# Patient Record
Sex: FEMALE | Race: WHITE | NOT HISPANIC OR LATINO | Employment: UNEMPLOYED | URBAN - NONMETROPOLITAN AREA
[De-identification: names, ages, dates, MRNs, and addresses within clinical notes are randomized per-mention and may not be internally consistent; named-entity substitution may affect disease eponyms.]

---

## 2018-09-25 ENCOUNTER — OFFICE VISIT (OUTPATIENT)
Dept: INTERNAL MEDICINE | Facility: CLINIC | Age: 28
End: 2018-09-25

## 2018-09-25 VITALS
WEIGHT: 288 LBS | SYSTOLIC BLOOD PRESSURE: 124 MMHG | HEART RATE: 85 BPM | TEMPERATURE: 98.4 F | OXYGEN SATURATION: 98 % | HEIGHT: 68 IN | DIASTOLIC BLOOD PRESSURE: 78 MMHG | BODY MASS INDEX: 43.65 KG/M2

## 2018-09-25 DIAGNOSIS — F41.1 GENERALIZED ANXIETY DISORDER: Primary | ICD-10-CM

## 2018-09-25 DIAGNOSIS — Z76.89 ENCOUNTER TO ESTABLISH CARE: ICD-10-CM

## 2018-09-25 PROBLEM — M32.9 HISTORY OF SYSTEMIC LUPUS ERYTHEMATOSUS (SLE) (HCC): Status: ACTIVE | Noted: 2018-09-25

## 2018-09-25 PROCEDURE — 99203 OFFICE O/P NEW LOW 30 MIN: CPT | Performed by: PHYSICIAN ASSISTANT

## 2018-09-25 NOTE — PROGRESS NOTES
Subjective   Delores Sprague is a 27 y.o. female who presents to [facility].  She recently moved here from [facility]. Her  is stationed here as an [employer] .     Chief Complaint:  She has 2 year old and 1.5 year old boys.   Has been on Zoloft or the last 10 months.  She struggled with postpartum depression after her 1st pregnancy and then after her 2nd pregnancy it worsened and she started Zoloft.  Zoloft has been helpful but she still has some anxiety at times and could use a sight dose increase. She denies SI, HI or sleep disturbances.       Lupus- diagnosed around age 12.  Suddenly stopped functioning, lost 80 pounds. She has gotten flare ups with malar rash, lung issues and kidney troubles periodically throughout adulthood with the last being right after delivering her 1st child in 2016. Last labs were done around March 2018 and she was old she is in remission.             The following portions of the patient's history were reviewed and updated as appropriate: She  has no past medical history on file.  She  does not have any pertinent problems on file.  She  has no past surgical history on file.  Her family history is not on file.  She  has no tobacco, alcohol, and drug history on file.  Current Outpatient Prescriptions   Medication Sig Dispense Refill   • sertraline (ZOLOFT) 50 MG tablet Take 1.5 tablets by mouth Daily for 90 days. 135 tablet 1     No current facility-administered medications for this visit.        Review of Systems  Review of Systems   Constitutional: Negative for appetite change, chills, fatigue, fever and unexpected weight change.        No malaise   HENT: Negative for ear pain, hearing loss, nosebleeds, rhinorrhea, sore throat, trouble swallowing and voice change.    Eyes: Negative for pain, discharge, redness, itching and visual disturbance.        No dry eyes   Respiratory: Negative for cough, shortness of breath and wheezing.         No SOB with exertion  No SOB  "lying down   Cardiovascular: Negative for chest pain, palpitations and leg swelling.        No leg cramps     Gastrointestinal: Negative for abdominal pain, blood in stool, constipation, diarrhea, nausea and vomiting.        No change in bowel habits  No heartburn   Endocrine: Negative for cold intolerance, heat intolerance, polydipsia, polyphagia and polyuria.        Hot flashes- with anxiety  No muscle weakness  No feeling of weakness   Genitourinary: Negative for difficulty urinating, dyspareunia, dysuria, frequency, hematuria, menstrual problem, pelvic pain, urgency, vaginal discharge and vaginal pain.        No urinary incontinence  No change in periods   Musculoskeletal: Negative for arthralgias, joint swelling and myalgias.        No limb pain  No limb swelling   Skin: Negative for rash and wound.        No rash  No lesions  No change in mole  No itching   Neurological: Negative for dizziness, seizures, syncope, weakness, numbness and headaches.        No confusion  No tingling  No trouble walking   Hematological: Negative for adenopathy. Does not bruise/bleed easily.   Psychiatric/Behavioral: Negative for confusion, dysphoric mood, sleep disturbance and suicidal ideas. The patient is nervous/anxious.         No change in personality         Objective    /78   Pulse 85   Temp 98.4 °F (36.9 °C)   Ht 172.7 cm (68\")   Wt 131 kg (288 lb)   SpO2 98%   BMI 43.79 kg/m²   Physical Exam   Constitutional: She is oriented to person, place, and time. She appears well-developed and well-nourished. No distress.   HENT:   Head: Normocephalic and atraumatic.   Right Ear: External ear normal.   Left Ear: External ear normal.   Nose: Nose normal.   Mouth/Throat: Oropharynx is clear and moist.   Eyes: Pupils are equal, round, and reactive to light. EOM are normal.   Neck: Normal range of motion. Neck supple. No thyromegaly present.   Cardiovascular: Normal rate, regular rhythm and normal heart sounds.  Exam reveals " no gallop and no friction rub.    No murmur heard.  Pulmonary/Chest: Effort normal and breath sounds normal. No respiratory distress. She has no wheezes. She has no rales. She exhibits no tenderness.   Abdominal: Soft. Bowel sounds are normal. She exhibits no distension and no mass. There is no tenderness. No hernia.   Musculoskeletal: Normal range of motion. She exhibits no edema, tenderness or deformity.   Lymphadenopathy:     She has no cervical adenopathy.   Neurological: She is alert and oriented to person, place, and time. She displays normal reflexes. No cranial nerve deficit or sensory deficit. Coordination normal.   Skin: Skin is warm and dry. Capillary refill takes less than 2 seconds. No rash noted. She is not diaphoretic. No erythema. No pallor.   Psychiatric: She has a normal mood and affect. Her behavior is normal. Judgment and thought content normal.   Nursing note and vitals reviewed.        Assessment/Plan      Diagnosis Plan   1. Generalized anxiety disorder  Dose increase to 75 mg via 1.5 tablets daily. sertraline (ZOLOFT) 50 MG tablet     2. Encounter to establish care             Return in about 6 months (around 3/25/2019) for Next scheduled follow up.

## 2018-11-16 ENCOUNTER — OFFICE VISIT (OUTPATIENT)
Dept: INTERNAL MEDICINE | Facility: CLINIC | Age: 28
End: 2018-11-16

## 2018-11-16 VITALS
SYSTOLIC BLOOD PRESSURE: 120 MMHG | TEMPERATURE: 98.4 F | WEIGHT: 268 LBS | DIASTOLIC BLOOD PRESSURE: 82 MMHG | BODY MASS INDEX: 40.62 KG/M2 | HEART RATE: 66 BPM | HEIGHT: 68 IN

## 2018-11-16 DIAGNOSIS — R10.821 RIGHT UPPER QUADRANT ABDOMINAL TENDERNESS WITH REBOUND TENDERNESS: ICD-10-CM

## 2018-11-16 DIAGNOSIS — R11.0 NAUSEA: ICD-10-CM

## 2018-11-16 DIAGNOSIS — Z00.00 PREVENTATIVE HEALTH CARE: ICD-10-CM

## 2018-11-16 DIAGNOSIS — R10.822 LEFT UPPER QUADRANT ABDOMINAL TENDERNESS WITH REBOUND TENDERNESS: ICD-10-CM

## 2018-11-16 DIAGNOSIS — H53.8 BLURRED VISION, BILATERAL: ICD-10-CM

## 2018-11-16 DIAGNOSIS — G44.031 INTRACTABLE EPISODIC PAROXYSMAL HEMICRANIA: ICD-10-CM

## 2018-11-16 DIAGNOSIS — R53.83 FATIGUE, UNSPECIFIED TYPE: Primary | ICD-10-CM

## 2018-11-16 DIAGNOSIS — G43.119 INTRACTABLE MIGRAINE WITH AURA WITHOUT STATUS MIGRAINOSUS: ICD-10-CM

## 2018-11-16 LAB
EXPIRATION DATE: NORMAL
HETEROPH AB SER QL LA: NEGATIVE
INTERNAL CONTROL: NORMAL
Lab: NORMAL

## 2018-11-16 PROCEDURE — 86308 HETEROPHILE ANTIBODY SCREEN: CPT | Performed by: PHYSICIAN ASSISTANT

## 2018-11-16 PROCEDURE — 99214 OFFICE O/P EST MOD 30 MIN: CPT | Performed by: PHYSICIAN ASSISTANT

## 2018-11-16 RX ORDER — SUMATRIPTAN 50 MG/1
TABLET, FILM COATED ORAL
Qty: 9 TABLET | Refills: 2 | Status: SHIPPED | OUTPATIENT
Start: 2018-11-16 | End: 2019-06-24 | Stop reason: ALTCHOICE

## 2018-11-16 RX ORDER — PROMETHAZINE HYDROCHLORIDE 25 MG/1
25 TABLET ORAL EVERY 6 HOURS PRN
Qty: 20 TABLET | Refills: 0 | Status: SHIPPED | OUTPATIENT
Start: 2018-11-16 | End: 2019-10-09

## 2018-11-16 NOTE — PROGRESS NOTES
Delores Sprague is a 27 y.o. female.     Subjective   History of Present Illness   Here today with concern of having intermittently blurry vision for the last 6 months or so and worsening.  Visual disturbance began by occurring once monthly or so and now is occurring every couple of days.  Right eye vision is worse than left.  She has developed headaches within the last 2-3 weeks which occur mostly on the right side.  Visual disturbances either start before the headache or occurs during the headache.  No history of migraines. She has tried ibuprofen which does not help at all. She described the visual disturbance as wavy with black in the periphery and some black spotting. She does have nausea with headaches. She has also had some intermittent aches in RUQ and LUQ. No fever, chills or sore throat. Has been fatigued.           The following portions of the patient's history were reviewed and updated as appropriate: allergies, current medications, past family history, past medical history, past social history, past surgical history and problem list.    Review of Systems    Constitutional: fatigue. Negative for appetite change, chills, fever and unexpected weight change.   HENT: Negative for congestion, ear pain, hearing loss, nosebleeds, postnasal drip, rhinorrhea, sore throat, tinnitus and trouble swallowing.    Eyes: visual disturbance. Negative for photophobia, discharge.   Respiratory: Negative for cough, chest tightness, shortness of breath and wheezing.    Cardiovascular: Negative for chest pain, palpitations and leg swelling.   Gastrointestinal: abdominal discomfort, nausea. Negative for abdominal distention, blood in stool, constipation, diarrhea and vomiting.   Endocrine: Negative for cold intolerance, heat intolerance, polydipsia, polyphagia and polyuria.   Musculoskeletal: Negative for arthralgias, back pain, joint swelling, myalgias, neck pain and neck stiffness.   Skin: Negative for color change, pallor,  "rash and wound.   Allergic/Immunologic: Negative for environmental allergies, food allergies and immunocompromised state.   Neurological: headaches. Negative for dizziness, tremors, seizures, weakness, numbness.  Hematological: Negative for adenopathy. Does not bruise/bleed easily.   Psychiatric/Behavioral: Negative for sleep disturbances, agitation, behavioral problems, confusion, hallucinations, self-injury and suicidal ideas. The patient is not nervous/anxious.      Objective    Physical Exam  Constitutional: Appears well-developed and well-nourished.   HENT: OP normal. TMs normal.   Head: no sinus tenderness. Normocephalic and atraumatic.   Eyes: EOM are normal. Pupils are equal, round, and reactive to light.   Neck: mild bilateral posterior cervical lymphadenopathy.  Normal range of motion. Neck supple.   Cardiovascular: Normal rate, regular rhythm and normal heart sounds.    Pulmonary/Chest: Effort normal and breath sounds normal. No respiratory distress.  Has no wheezes or rales. Exhibits no chest wall tenderness.   Abdominal: mild LUQ and RUQ tenderness. Soft. Bowel sounds are normal. Exhibits no distension and no mass.   Musculoskeletal: Normal range of motion. Exhibits no tenderness.   Neurological: CN II-XII intact.   Skin: Skin is warm and dry.   Psychiatric: Has a normal mood and affect. Behavior is normal. Judgment and thought content normal.       /82   Pulse 66   Temp 98.4 °F (36.9 °C)   Ht 172.7 cm (67.99\")   Wt 122 kg (268 lb)   BMI 40.76 kg/m²     Nursing note and vitals reviewed.          Assessment/Plan   Delores was seen today for headache.    Diagnoses and all orders for this visit:    Fatigue, unspecified type  -     Hemoglobin A1c  -     Comprehensive Metabolic Panel  -     CBC & Differential  -     Prolactin  -     Jc-Barr Virus VCA Antibody Panel  -     POC Infectious Mononucleosis Antibody- negative    Intractable episodic paroxysmal hemicrania  -     promethazine " (PHENERGAN) 25 MG tablet; Take 1 tablet by mouth Every 6 (Six) Hours As Needed for Nausea or Vomiting (migraine onset).  -     SUMAtriptan (IMITREX) 50 MG tablet; Take one tablet at onset of headache. May repeat dose one time in 2 hours if headache not relieved.  -     Comprehensive Metabolic Panel  -     CBC & Differential    Blurred vision, bilateral  -     Hemoglobin A1c  -     Comprehensive Metabolic Panel  -     CBC & Differential  -     Prolactin    Intractable migraine with aura without status migrainosus  -     Comprehensive Metabolic Panel  -     CBC & Differential  -     Prolactin  -     POC Infectious Mononucleosis Antibody- negative    Right upper quadrant abdominal tenderness with rebound tenderness  -     Comprehensive Metabolic Panel  -     CBC & Differential  -     POC Infectious Mononucleosis Antibody- negative    Left upper quadrant abdominal tenderness with rebound tenderness  -     Comprehensive Metabolic Panel  -     CBC & Differential  -     POC Infectious Mononucleosis Antibody- negative    [facility] care  -     Lipid Panel    Nausea  -     promethazine (PHENERGAN) 25 MG tablet; Take 1 tablet by mouth Every 6 (Six) Hours As Needed for Nausea or Vomiting (migraine onset).  -     Vitamin B12  -     Vitamin D 25 Hydroxy  -     Comprehensive Metabolic Panel  -     CBC & Differential  -     Prolactin

## 2018-11-17 LAB
25(OH)D3+25(OH)D2 SERPL-MCNC: 31.3 NG/ML
ALBUMIN SERPL-MCNC: 4.6 G/DL (ref 3.5–5)
ALBUMIN/GLOB SERPL: 1.6 G/DL (ref 1–2)
ALP SERPL-CCNC: 76 U/L (ref 38–126)
ALT SERPL-CCNC: 35 U/L (ref 13–69)
AST SERPL-CCNC: 23 U/L (ref 15–46)
BASOPHILS # BLD AUTO: 0.04 10*3/MM3 (ref 0–0.2)
BASOPHILS NFR BLD AUTO: 0.4 % (ref 0–2.5)
BILIRUB SERPL-MCNC: 0.5 MG/DL (ref 0.2–1.3)
BUN SERPL-MCNC: 13 MG/DL (ref 7–20)
BUN/CREAT SERPL: 16.3 (ref 7.1–23.5)
CALCIUM SERPL-MCNC: 10 MG/DL (ref 8.4–10.2)
CHLORIDE SERPL-SCNC: 105 MMOL/L (ref 98–107)
CHOLEST SERPL-MCNC: 144 MG/DL (ref 0–199)
CO2 SERPL-SCNC: 23 MMOL/L (ref 26–30)
CREAT SERPL-MCNC: 0.8 MG/DL (ref 0.6–1.3)
EBV EA IGG SER-ACNC: 18.5 U/ML (ref 0–8.9)
EBV NA IGG SER IA-ACNC: >600 U/ML (ref 0–17.9)
EBV VCA IGG SER IA-ACNC: 265 U/ML (ref 0–17.9)
EBV VCA IGM SER IA-ACNC: 46 U/ML (ref 0–35.9)
EOSINOPHIL # BLD AUTO: 0.46 10*3/MM3 (ref 0–0.7)
EOSINOPHIL NFR BLD AUTO: 4.5 % (ref 0–7)
ERYTHROCYTE [DISTWIDTH] IN BLOOD BY AUTOMATED COUNT: 15.1 % (ref 11.5–14.5)
GLOBULIN SER CALC-MCNC: 2.8 GM/DL
GLUCOSE SERPL-MCNC: 91 MG/DL (ref 74–98)
HBA1C MFR BLD: 5.3 %
HCT VFR BLD AUTO: 46 % (ref 37–47)
HDLC SERPL-MCNC: 46 MG/DL (ref 40–60)
HGB BLD-MCNC: 14.9 G/DL (ref 12–16)
IMM GRANULOCYTES # BLD: 0.02 10*3/MM3 (ref 0–0.06)
IMM GRANULOCYTES NFR BLD: 0.2 % (ref 0–0.6)
LDLC SERPL CALC-MCNC: 75 MG/DL (ref 0–99)
LYMPHOCYTES # BLD AUTO: 3.53 10*3/MM3 (ref 0.6–3.4)
LYMPHOCYTES NFR BLD AUTO: 34.3 % (ref 10–50)
MCH RBC QN AUTO: 26.8 PG (ref 27–31)
MCHC RBC AUTO-ENTMCNC: 32.4 G/DL (ref 30–37)
MCV RBC AUTO: 82.6 FL (ref 81–99)
MONOCYTES # BLD AUTO: 0.63 10*3/MM3 (ref 0–0.9)
MONOCYTES NFR BLD AUTO: 6.1 % (ref 0–12)
NEUTROPHILS # BLD AUTO: 5.6 10*3/MM3 (ref 2–6.9)
NEUTROPHILS NFR BLD AUTO: 54.5 % (ref 37–80)
NRBC BLD AUTO-RTO: 0 /100 WBC (ref 0–0)
PLATELET # BLD AUTO: 376 10*3/MM3 (ref 130–400)
POTASSIUM SERPL-SCNC: 5.4 MMOL/L (ref 3.5–5.1)
PROLACTIN SERPL-MCNC: 10.7 NG/ML (ref 4.8–23.3)
PROT SERPL-MCNC: 7.4 G/DL (ref 6.3–8.2)
RBC # BLD AUTO: 5.57 10*6/MM3 (ref 4.2–5.4)
SERVICE CMNT-IMP: ABNORMAL
SODIUM SERPL-SCNC: 138 MMOL/L (ref 137–145)
TRIGL SERPL-MCNC: 115 MG/DL
VIT B12 SERPL-MCNC: 744 PG/ML (ref 239–931)
VLDLC SERPL CALC-MCNC: 23 MG/DL
WBC # BLD AUTO: 10.28 10*3/MM3 (ref 4.8–10.8)

## 2019-01-07 ENCOUNTER — OFFICE VISIT (OUTPATIENT)
Dept: INTERNAL MEDICINE | Facility: CLINIC | Age: 29
End: 2019-01-07

## 2019-01-07 VITALS
WEIGHT: 261 LBS | SYSTOLIC BLOOD PRESSURE: 136 MMHG | TEMPERATURE: 98.5 F | BODY MASS INDEX: 39.56 KG/M2 | OXYGEN SATURATION: 99 % | DIASTOLIC BLOOD PRESSURE: 84 MMHG | HEART RATE: 78 BPM | HEIGHT: 68 IN

## 2019-01-07 DIAGNOSIS — R06.00 DYSPNEA, UNSPECIFIED TYPE: ICD-10-CM

## 2019-01-07 DIAGNOSIS — R21 MALAR RASH: Primary | ICD-10-CM

## 2019-01-07 DIAGNOSIS — IMO0002 HISTORY OF LUPUS: ICD-10-CM

## 2019-01-07 DIAGNOSIS — R05.9 COUGH: ICD-10-CM

## 2019-01-07 PROCEDURE — 99213 OFFICE O/P EST LOW 20 MIN: CPT | Performed by: PHYSICIAN ASSISTANT

## 2019-01-07 RX ORDER — METHYLPREDNISOLONE 4 MG/1
TABLET ORAL
Qty: 1 EACH | Refills: 0 | OUTPATIENT
Start: 2019-01-07 | End: 2019-01-26

## 2019-01-07 RX ORDER — ALBUTEROL SULFATE 90 UG/1
2 AEROSOL, METERED RESPIRATORY (INHALATION) EVERY 4 HOURS PRN
Qty: 1 INHALER | Refills: 11 | OUTPATIENT
Start: 2019-01-07 | End: 2019-12-31

## 2019-01-07 NOTE — PROGRESS NOTES
Delores Sprague is a 28 y.o. female.     Subjective   History of Present Illness   Here today with report of some cough and trouble breathing intermittently for the last 3 weeks.  [place] the [place] and was having trouble with SOA while walking but then while sitting for lunch she was also having trouble breathing while eating and talking.  Last night while coughing she began vomiting which continued until she could stop coughing.  No fever.  She also reports arthralgias of the legs and knees which were particularly bad last night.  No joint swelling.  Feet and hands have started tingling again on and off for a few weeks which has happened in the past and workup with EMG of her feet was inconclusive.  She is also feeling very fatigued today.  She has had a constant left upper quadrant abdominal pain for a few months. This morning she awoke with facial rash.  She had very similar symptoms in the past when she was diagnosed with lupus at age 12 but then later in adulthood she was told she did not have lupus, so she is unsure which to believe.  When diagnosed with lupus she had positive labs 2/6 times and was eventually diagnosed with lupus by a pulmonologist.  Last flare up of similar symptoms was around 1 year ago.         The following portions of the patient's history were reviewed and updated as appropriate: allergies, current medications, past family history, past medical history, past social history, past surgical history and problem list.    Review of Systems    Constitutional: fatigue.  Negative for appetite change, chills, fever and unexpected weight change.   HENT: Negative for congestion, ear pain, hearing loss, nosebleeds, postnasal drip, rhinorrhea, sore throat, tinnitus and trouble swallowing.    Eyes: Negative for photophobia, discharge and visual disturbance.   Respiratory: cough, SOA, chest tightness.  Negative for wheezing.    Cardiovascular: Negative for chest pain, palpitations and leg  "swelling.   Gastrointestinal: abdominal pain, nausea and vomiting.  Negative for abdominal distention, blood in stool, constipation, diarrhea.  Endocrine: Negative for cold intolerance, heat intolerance, polydipsia, polyphagia and polyuria.   Musculoskeletal: arthralgias. Negative for back pain, joint swelling, myalgias, neck pain and neck stiffness.   Skin: Negative for color change, pallor, rash and wound.   Allergic/Immunologic: Negative for environmental allergies, food allergies and immunocompromised state.   Neurological: Negative for dizziness, tremors, seizures, weakness, numbness and headaches.   Hematological: Negative for adenopathy. Does not bruise/bleed easily.   Psychiatric/Behavioral: Negative for sleep disturbances, agitation, behavioral problems, confusion, hallucinations, self-injury and suicidal ideas. The patient is not nervous/anxious.      Objective    Physical Exam  Constitutional: Appears well-developed and well-nourished.   HENT: OP normal.   Head: Normocephalic and atraumatic.   Eyes: EOM are normal. Pupils are equal, round, and reactive to light.   Neck: Normal range of motion. Neck supple.   Cardiovascular: Normal rate, regular rhythm and normal heart sounds.    Pulmonary/Chest: Effort normal and breath sounds normal. No respiratory distress.  Has no wheezes or rales. Exhibits no chest wall tenderness.   Abdominal: Soft. Bowel sounds are normal. Exhibits no distension and no mass. There is no tenderness.   Musculoskeletal: Normal range of motion. Exhibits no tenderness.   Neurological: Alert and oriented to person, place, and time.   Skin: raised erythematous maculopapular rash of face in malar distribution but including eyelids.  Skin is warm and dry.   Psychiatric: Has a normal mood and affect. Behavior is normal. Judgment and thought content normal.       /84   Pulse 78   Temp 98.5 °F (36.9 °C)   Ht 172.7 cm (67.99\")   Wt 118 kg (261 lb)   SpO2 99%   BMI 39.69 kg/m² "     Nursing note and vitals reviewed.        Assessment/Plan   Delores was seen today for follow-up.    Diagnoses and all orders for this visit:    Malar rash  -     Anti-DNA Antibody, Double-stranded  -     Antiphospholipid Syndrome  -     Antinuclear Antibody With Reflex [place]    Cough  -     albuterol sulfate  (90 Base) MCG/ACT inhaler; Inhale 2 puffs Every 4 (Four) Hours As Needed for Wheezing or Shortness of Air.  -     MethylPREDNISolone (MEDROL, ROSY,) 4 MG tablet; Take as directed on package instructions.  -     Anti-DNA Antibody, Double-stranded  -     Comprehensive Metabolic Panel  -     CBC & Differential  -     Antiphospholipid Syndrome  -     Antinuclear Antibody With Reflex Cascade    Dyspnea, unspecified type  -     albuterol sulfate  (90 Base) MCG/ACT inhaler; Inhale 2 puffs Every 4 (Four) Hours As Needed for Wheezing or Shortness of Air.  -     MethylPREDNISolone (MEDROL, ROSY,) 4 MG tablet; Take as directed on package instructions.  -     Anti-DNA Antibody, Double-stranded  -     Antiphospholipid Syndrome  -     Antinuclear Antibody With Reflex Cascade    History of lupus  -     MethylPREDNISolone (MEDROL, ROSY,) 4 MG tablet; Take as directed on package instructions.  -     Anti-DNA Antibody, Double-stranded  -     Comprehensive Metabolic Panel  -     CBC & Differential  -     Antiphospholipid Syndrome  -     Antinuclear Antibody With Reflex [place]    Other orders  -     Coag Studies Interp Report  -     [facility] Comprehensive Plus Profile

## 2019-01-08 LAB — DSDNA AB SER-ACNC: NORMAL [IU]/ML

## 2019-01-09 ENCOUNTER — TELEPHONE (OUTPATIENT)
Dept: INTERNAL MEDICINE | Facility: CLINIC | Age: 29
End: 2019-01-09

## 2019-01-09 LAB
ALBUMIN SERPL-MCNC: 4.4 G/DL (ref 3.5–5)
ALBUMIN/GLOB SERPL: 1.8 G/DL (ref 1–2)
ALP SERPL-CCNC: 77 U/L (ref 38–126)
ALT SERPL-CCNC: 37 U/L (ref 13–69)
APTT HEX PL PPP: 1 SEC (ref 0–11)
APTT PPP: 25 SEC (ref 22.9–30.2)
AST SERPL-CCNC: 25 U/L (ref 15–46)
B2 GLYCOPROT1 IGG SER-ACNC: 9 GPI IGG UNITS (ref 0–20)
B2 GLYCOPROT1 IGM SER-ACNC: <9 GPI IGM UNITS (ref 0–32)
BASOPHILS # BLD AUTO: 0.05 10*3/MM3 (ref 0–0.2)
BASOPHILS NFR BLD AUTO: 0.4 % (ref 0–2.5)
BILIRUB SERPL-MCNC: 0.3 MG/DL (ref 0.2–1.3)
BUN SERPL-MCNC: 14 MG/DL (ref 7–20)
BUN/CREAT SERPL: 20 (ref 7.1–23.5)
CALCIUM SERPL-MCNC: 9.2 MG/DL (ref 8.4–10.2)
CARDIOLIPIN IGG SER IA-ACNC: <9 GPL U/ML (ref 0–14)
CARDIOLIPIN IGM SER IA-ACNC: <9 MPL U/ML (ref 0–12)
CENTROMERE B AB SER-ACNC: <0.2 AI (ref 0–0.9)
CHLORIDE SERPL-SCNC: 105 MMOL/L (ref 98–107)
CHROMATIN AB SERPL-ACNC: <0.2 AI (ref 0–0.9)
CO2 SERPL-SCNC: 24 MMOL/L (ref 26–30)
CREAT SERPL-MCNC: 0.7 MG/DL (ref 0.6–1.3)
DSDNA AB SER-ACNC: 1 IU/ML (ref 0–9)
ENA JO1 AB SER-ACNC: <0.2 AI (ref 0–0.9)
ENA RNP AB SER-ACNC: <0.2 AI (ref 0–0.9)
ENA SCL70 AB SER-ACNC: <0.2 AI (ref 0–0.9)
ENA SM AB SER-ACNC: <0.2 AI (ref 0–0.9)
ENA SM+RNP AB SER-ACNC: <0.2 AI (ref 0–0.9)
ENA SS-A AB SER-ACNC: <0.2 AI (ref 0–0.9)
ENA SS-B AB SER-ACNC: <0.2 AI (ref 0–0.9)
EOSINOPHIL # BLD AUTO: 0.86 10*3/MM3 (ref 0–0.7)
EOSINOPHIL NFR BLD AUTO: 7.1 % (ref 0–7)
ERYTHROCYTE [DISTWIDTH] IN BLOOD BY AUTOMATED COUNT: 14.6 % (ref 11.5–14.5)
GLOBULIN SER CALC-MCNC: 2.5 GM/DL
GLUCOSE SERPL-MCNC: 83 MG/DL (ref 74–98)
HCT VFR BLD AUTO: 46.2 % (ref 37–47)
HGB BLD-MCNC: 14.9 G/DL (ref 12–16)
IMM GRANULOCYTES # BLD AUTO: 0.04 10*3/MM3 (ref 0–0.06)
IMM GRANULOCYTES NFR BLD AUTO: 0.3 % (ref 0–0.6)
INR PPP: 1 (ref 0.9–1.1)
LYMPHOCYTES # BLD AUTO: 3.71 10*3/MM3 (ref 0.6–3.4)
LYMPHOCYTES NFR BLD AUTO: 30.5 % (ref 10–50)
Lab: NORMAL
MCH RBC QN AUTO: 27 PG (ref 27–31)
MCHC RBC AUTO-ENTMCNC: 32.3 G/DL (ref 30–37)
MCV RBC AUTO: 83.8 FL (ref 81–99)
MONOCYTES # BLD AUTO: 0.58 10*3/MM3 (ref 0–0.9)
MONOCYTES NFR BLD AUTO: 4.8 % (ref 0–12)
NEUTROPHILS # BLD AUTO: 6.94 10*3/MM3 (ref 2–6.9)
NEUTROPHILS NFR BLD AUTO: 56.9 % (ref 37–80)
NRBC BLD AUTO-RTO: 0 /100 WBC (ref 0–0)
PATH INTERP BLD-IMP: NORMAL
PATH INTERP SPEC-IMP: NORMAL
PLATELET # BLD AUTO: 391 10*3/MM3 (ref 130–400)
POTASSIUM SERPL-SCNC: 4.4 MMOL/L (ref 3.5–5.1)
PROT SERPL-MCNC: 6.9 G/DL (ref 6.3–8.2)
PROTHROMBIN TIME: 10.3 SEC (ref 9.6–11.5)
RBC # BLD AUTO: 5.51 10*6/MM3 (ref 4.2–5.4)
RIBOSOMAL P AB SER-ACNC: <0.2 AI (ref 0–0.9)
SCREEN DRVVT: 33.3 SEC (ref 0–47)
SODIUM SERPL-SCNC: 141 MMOL/L (ref 137–145)
THROMBIN TIME: 16.4 SEC (ref 0–23)
WBC # BLD AUTO: 12.18 10*3/MM3 (ref 4.8–10.8)

## 2019-01-10 RX ORDER — AZITHROMYCIN 250 MG/1
TABLET, FILM COATED ORAL
Qty: 6 TABLET | Refills: 0 | OUTPATIENT
Start: 2019-01-10 | End: 2019-01-26

## 2019-01-25 DIAGNOSIS — K02.9 PAIN DUE TO DENTAL CARIES: Primary | ICD-10-CM

## 2019-02-15 ENCOUNTER — OFFICE VISIT (OUTPATIENT)
Dept: INTERNAL MEDICINE | Facility: CLINIC | Age: 29
End: 2019-02-15

## 2019-02-15 VITALS
DIASTOLIC BLOOD PRESSURE: 84 MMHG | BODY MASS INDEX: 37.89 KG/M2 | HEART RATE: 81 BPM | OXYGEN SATURATION: 98 % | TEMPERATURE: 98.4 F | HEIGHT: 68 IN | WEIGHT: 250 LBS | SYSTOLIC BLOOD PRESSURE: 114 MMHG

## 2019-02-15 DIAGNOSIS — R63.4 WEIGHT LOSS: ICD-10-CM

## 2019-02-15 DIAGNOSIS — L98.7 EXCESS SKIN OF ABDOMINAL WALL: Primary | ICD-10-CM

## 2019-02-15 PROCEDURE — 99213 OFFICE O/P EST LOW 20 MIN: CPT | Performed by: PHYSICIAN ASSISTANT

## 2019-02-15 NOTE — PROGRESS NOTES
Delores Sprague is a 28 y.o. female.     Subjective   History of Present Illness   Here today to discuss excess skin of the abdomen. She has intentionally lost around 70 pounds in the last 9 months or so since then has had trouble exercising due to excess loose skin of her abdomen being in the way and causing a tugging pain in the area of her caesarian section.  She intends to lose around 20 more pounds before she reaches her goal weight.         The following portions of the patient's history were reviewed and updated as appropriate: allergies, current medications, past family history, past medical history, past social history, past surgical history and problem list.    Review of Systems   Constitutional: Positive for unexpected weight change (intentional 70 pound weight loss). Negative for appetite change, chills, fatigue and fever.   Respiratory: Negative for cough, chest tightness, shortness of breath and wheezing.    Cardiovascular: Negative for chest pain, palpitations and leg swelling.   Gastrointestinal: Positive for abdominal distention (excess skin) and abdominal pain (tugging sensation of  site). Negative for blood in stool, constipation, diarrhea, nausea and vomiting.   Allergic/Immunologic: Negative.    Hematological: Negative.    Psychiatric/Behavioral: Negative for dysphoric mood, self-injury and suicidal ideas. The patient is not nervous/anxious.          Objective    Physical Exam   Constitutional: She is oriented to person, place, and time. She appears well-developed and well-nourished. No distress.   Pulmonary/Chest: Effort normal.   Abdominal: Soft. Bowel sounds are normal. She exhibits distension (loose skin of abdominal wall). There is no tenderness.   Neurological: She is alert and oriented to person, place, and time. Coordination normal.   Skin: She is not diaphoretic.   Psychiatric: She has a normal mood and affect. Her behavior is normal. Judgment and thought content normal.  "  Nursing note and vitals reviewed.          /84   Pulse 81   Temp 98.4 °F (36.9 °C)   Ht 172.7 cm (67.99\")   Wt 113 kg (250 lb)   SpO2 98%   BMI 38.02 kg/m²     Nursing note and vitals reviewed.        Assessment/Plan   Delores was seen today for discuss excess skin.    Diagnoses and all orders for this visit:    Excess skin of abdominal wall  -     Ambulatory Referral to Plastic Surgery    Weight loss  -     Ambulatory Referral to Plastic Surgery                 "

## 2019-03-08 ENCOUNTER — OFFICE VISIT (OUTPATIENT)
Dept: INTERNAL MEDICINE | Facility: CLINIC | Age: 29
End: 2019-03-08

## 2019-03-08 VITALS
DIASTOLIC BLOOD PRESSURE: 80 MMHG | WEIGHT: 246 LBS | SYSTOLIC BLOOD PRESSURE: 120 MMHG | HEIGHT: 68 IN | OXYGEN SATURATION: 100 % | HEART RATE: 71 BPM | BODY MASS INDEX: 37.28 KG/M2 | TEMPERATURE: 98.4 F

## 2019-03-08 DIAGNOSIS — K52.9 POSTPRANDIAL DIARRHEA: ICD-10-CM

## 2019-03-08 DIAGNOSIS — R31.29 HEMATURIA, MICROSCOPIC: ICD-10-CM

## 2019-03-08 DIAGNOSIS — M22.8X1 PATELLAR TRACKING DISORDER OF RIGHT KNEE: ICD-10-CM

## 2019-03-08 DIAGNOSIS — R10.11 COLICKY RUQ ABDOMINAL PAIN: Primary | ICD-10-CM

## 2019-03-08 LAB
BILIRUB BLD-MCNC: NEGATIVE MG/DL
CLARITY, POC: ABNORMAL
COLOR UR: YELLOW
GLUCOSE UR STRIP-MCNC: NEGATIVE MG/DL
KETONES UR QL: NEGATIVE
LEUKOCYTE EST, POC: ABNORMAL
NITRITE UR-MCNC: NEGATIVE MG/ML
PH UR: 5 [PH] (ref 5–8)
PROT UR STRIP-MCNC: NEGATIVE MG/DL
RBC # UR STRIP: ABNORMAL /UL
SP GR UR: 1.01 (ref 1–1.03)
UROBILINOGEN UR QL: NORMAL

## 2019-03-08 PROCEDURE — 99214 OFFICE O/P EST MOD 30 MIN: CPT | Performed by: PHYSICIAN ASSISTANT

## 2019-03-08 PROCEDURE — 81003 URINALYSIS AUTO W/O SCOPE: CPT | Performed by: PHYSICIAN ASSISTANT

## 2019-03-08 NOTE — PROGRESS NOTES
Delores Sprague is a 28 y.o. female.     Subjective   History of Present Illness   Here today with concern of RUQ discomfort intermittently for a few months with increasing frequency.  She describes the pain as a throbbing ache which feels like there is gas there which worsens to a sharp pain occasionally after eating heavier foods or spicy foods. She has also been fluctuating between constipation and diarrhea.  She keeps a very healthy diet by following weight watcher's and has lost 76 pounds in the last 6-7 months. She denies fever, nausea, vomiting, indigestion or blood in her stool. She did notice a little pink when wiping after urinating yesterday. She denies a history of kidney stones. No low back pain, lower abdominal pain, dysuria, hematuria, increased frequency or urgency.     She is also concerned with a few months of worsening right knee pain.  Her  noticed while exercising the right patella seemed to drop abnormally. The knee hurts to the touch, if kneeling or if bending or straightening the knee completely. The pain is located medial, distal and beneath the patella. There has been some swelling and warmth at random times, not notably occurring after exercise.          The following portions of the patient's history were reviewed and updated as appropriate: allergies, current medications, past family history, past medical history, past social history, past surgical history and problem list.    Review of Systems   Constitutional: Negative for appetite change, chills, fatigue, fever and unexpected weight change (intentional weight loss).   Respiratory: Negative for cough, chest tightness, shortness of breath and wheezing.    Cardiovascular: Negative for chest pain, palpitations and leg swelling.   Gastrointestinal: Positive for abdominal pain, constipation and diarrhea. Negative for abdominal distention, anal bleeding, blood in stool, nausea, rectal pain and vomiting.   Genitourinary: Positive for  hematuria. Negative for decreased urine volume, difficulty urinating, dyspareunia, dysuria, enuresis, flank pain, frequency, genital sores, menstrual problem, pelvic pain, urgency, vaginal bleeding, vaginal discharge and vaginal pain.   Musculoskeletal: Positive for arthralgias and joint swelling. Negative for back pain, gait problem, myalgias, neck pain and neck stiffness.   Neurological: Negative for dizziness, tremors, seizures, speech difficulty, weakness and headaches.   Hematological: Negative for adenopathy. Does not bruise/bleed easily.   Psychiatric/Behavioral: Negative for agitation, confusion, dysphoric mood, self-injury and suicidal ideas. The patient is not nervous/anxious and is not hyperactive.        Objective    Physical Exam   Constitutional: She is oriented to person, place, and time. She appears well-developed and well-nourished. No distress.   Eyes: Conjunctivae and EOM are normal. Pupils are equal, round, and reactive to light.   Neck: Normal range of motion. Neck supple.   Cardiovascular: Normal rate, regular rhythm and normal heart sounds. Exam reveals no gallop and no friction rub.   No murmur heard.  Pulmonary/Chest: Effort normal and breath sounds normal. No respiratory distress. She has no wheezes. She has no rales.   Abdominal: Soft. Bowel sounds are normal. She exhibits no distension and no mass. There is tenderness (RUQ tenderness). There is no rebound and no guarding. No hernia.   Musculoskeletal: She exhibits tenderness (no costovertebral angle tenderness). She exhibits no edema or deformity.        Right knee: She exhibits decreased range of motion and abnormal patellar mobility. She exhibits no ecchymosis, no erythema, no LCL laxity, normal meniscus and no MCL laxity. Tenderness found. Medial joint line, MCL and patellar tendon tenderness noted. No lateral joint line and no LCL tenderness noted.   Neurological: She is alert and oriented to person, place, and time. She displays  "normal reflexes. No cranial nerve deficit or sensory deficit. She exhibits normal muscle tone. Coordination normal.   Skin: Skin is warm and dry. Capillary refill takes less than 2 seconds. No rash noted. She is not diaphoretic.   Psychiatric: She has a normal mood and affect. Her behavior is normal. Judgment and thought content normal.   Nursing note and vitals reviewed.        /80   Pulse 71   Temp 98.4 °F (36.9 °C)   Ht 172.7 cm (67.99\")   Wt 112 kg (246 lb)   SpO2 100%   BMI 37.41 kg/m²     Nursing note and vitals reviewed.        Assessment/Plan   Delores was seen today for r side pain feels like air in there and knee pain.    Diagnoses and all orders for this visit:    Colicky RUQ abdominal pain  -     US Gallbladder    Postprandial diarrhea  -     US Gallbladder    Hematuria, microscopic  -     Urine Culture - Urine, Urine, Clean Catch  -     POC Urinalysis Dipstick, Automated  Brief Urine Lab Results  (Last result in the past 365 days)      Color   Clarity   Blood   Leuk Est   Nitrite   Protein   CREAT   Urine HCG        03/08/19 1043 Yellow Cloudy Trace 500 Toni/ul Negative Negative           Low suspicion for UTI, will wait to treat.       Patellar tracking disorder of right knee  -     Ambulatory Referral to Physical Therapy Evaluate and treat                 "

## 2019-03-10 LAB
BACTERIA UR CULT: NORMAL
BACTERIA UR CULT: NORMAL

## 2019-03-13 ENCOUNTER — HOSPITAL ENCOUNTER (OUTPATIENT)
Dept: ULTRASOUND IMAGING | Facility: HOSPITAL | Age: 29
Discharge: HOME | End: 2019-03-13
Admitting: PHYSICIAN ASSISTANT

## 2019-03-13 PROCEDURE — 76705 ECHO EXAM OF ABDOMEN: CPT

## 2019-03-14 DIAGNOSIS — R10.11 RUQ PAIN: Primary | ICD-10-CM

## 2019-03-14 DIAGNOSIS — K52.9 POSTPRANDIAL DIARRHEA: ICD-10-CM

## 2019-03-26 ENCOUNTER — OFFICE VISIT (OUTPATIENT)
Dept: INTERNAL MEDICINE | Facility: CLINIC | Age: 29
End: 2019-03-26

## 2019-03-26 VITALS
SYSTOLIC BLOOD PRESSURE: 118 MMHG | WEIGHT: 251 LBS | OXYGEN SATURATION: 99 % | DIASTOLIC BLOOD PRESSURE: 78 MMHG | HEIGHT: 68 IN | HEART RATE: 64 BPM | BODY MASS INDEX: 38.04 KG/M2 | TEMPERATURE: 98.6 F

## 2019-03-26 DIAGNOSIS — R10.10 UPPER ABDOMINAL PAIN: ICD-10-CM

## 2019-03-26 DIAGNOSIS — F41.1 GENERALIZED ANXIETY DISORDER: Primary | ICD-10-CM

## 2019-03-26 DIAGNOSIS — K52.9 POSTPRANDIAL DIARRHEA: ICD-10-CM

## 2019-03-26 LAB
ALBUMIN SERPL-MCNC: 3.8 G/DL (ref 3.5–5)
ALBUMIN/GLOB SERPL: 1.5 G/DL (ref 1–2)
ALP SERPL-CCNC: 74 U/L (ref 38–126)
ALT SERPL-CCNC: 42 U/L (ref 13–69)
AST SERPL-CCNC: 24 U/L (ref 15–46)
BASOPHILS # BLD AUTO: 0.06 10*3/MM3 (ref 0–0.2)
BASOPHILS NFR BLD AUTO: 0.6 % (ref 0–1.5)
BILIRUB SERPL-MCNC: 0.3 MG/DL (ref 0.2–1.3)
BUN SERPL-MCNC: 16 MG/DL (ref 7–20)
BUN/CREAT SERPL: 22.9 (ref 7.1–23.5)
CALCIUM SERPL-MCNC: 9 MG/DL (ref 8.4–10.2)
CHLORIDE SERPL-SCNC: 106 MMOL/L (ref 98–107)
CO2 SERPL-SCNC: 27 MMOL/L (ref 26–30)
CREAT SERPL-MCNC: 0.7 MG/DL (ref 0.6–1.3)
EOSINOPHIL # BLD AUTO: 0.89 10*3/MM3 (ref 0–0.4)
EOSINOPHIL NFR BLD AUTO: 9.5 % (ref 0.3–6.2)
ERYTHROCYTE [DISTWIDTH] IN BLOOD BY AUTOMATED COUNT: 14.7 % (ref 12.3–15.4)
GLOBULIN SER CALC-MCNC: 2.5 GM/DL
GLUCOSE SERPL-MCNC: 88 MG/DL (ref 74–98)
HCT VFR BLD AUTO: 42.4 % (ref 34–46.6)
HGB BLD-MCNC: 13.9 G/DL (ref 12–15.9)
IMM GRANULOCYTES # BLD AUTO: 0.03 10*3/MM3 (ref 0–0.05)
IMM GRANULOCYTES NFR BLD AUTO: 0.3 % (ref 0–0.5)
LIPASE SERPL-CCNC: 163 U/L (ref 23–300)
LYMPHOCYTES # BLD AUTO: 2.96 10*3/MM3 (ref 0.7–3.1)
LYMPHOCYTES NFR BLD AUTO: 31.6 % (ref 19.6–45.3)
MCH RBC QN AUTO: 27.8 PG (ref 26.6–33)
MCHC RBC AUTO-ENTMCNC: 32.8 G/DL (ref 31.5–35.7)
MCV RBC AUTO: 84.8 FL (ref 79–97)
MONOCYTES # BLD AUTO: 0.67 10*3/MM3 (ref 0.1–0.9)
MONOCYTES NFR BLD AUTO: 7.2 % (ref 5–12)
NEUTROPHILS # BLD AUTO: 4.75 10*3/MM3 (ref 1.4–7)
NEUTROPHILS NFR BLD AUTO: 50.8 % (ref 42.7–76)
NRBC BLD AUTO-RTO: 0 /100 WBC (ref 0–0)
PLATELET # BLD AUTO: 330 10*3/MM3 (ref 140–450)
POTASSIUM SERPL-SCNC: 4.8 MMOL/L (ref 3.5–5.1)
PROT SERPL-MCNC: 6.3 G/DL (ref 6.3–8.2)
RBC # BLD AUTO: 5 10*6/MM3 (ref 3.77–5.28)
SODIUM SERPL-SCNC: 138 MMOL/L (ref 137–145)
TSH SERPL DL<=0.005 MIU/L-ACNC: 3.49 MIU/ML (ref 0.47–4.68)
WBC # BLD AUTO: 9.36 10*3/MM3 (ref 3.4–10.8)

## 2019-03-26 PROCEDURE — 99214 OFFICE O/P EST MOD 30 MIN: CPT | Performed by: PHYSICIAN ASSISTANT

## 2019-03-26 RX ORDER — BUSPIRONE HYDROCHLORIDE 5 MG/1
TABLET ORAL
Qty: 180 TABLET | Refills: 5 | Status: SHIPPED | OUTPATIENT
Start: 2019-03-26

## 2019-03-26 NOTE — PROGRESS NOTES
Delores Sprague is a 28 y.o. female.     Subjective   History of Present Illness   Here today for six-month follow-up of generalized anxiety disorder.  She is currently taking Zoloft 50 mg daily which she feels helps with agitation but does not help much with anxiety.  She notes that she does still get more anxious than she would like, particularly if she has something anxious going on.  She has previously tried Lexapro which caused somnolence.  She does not feel she has tried any others. She denies SI, HI or sleep disturbances.     She continues to have RUQ abdominal pain which is worse after eating as well as occasional postprandial nausea and diarrhea with greasy or fatty foods. The abdominal pain recently began radiating to the middle of her back at times.  She denies fever or vomiting.  She is scheduled for a HIDA scan tomorrow.         The following portions of the patient's history were reviewed and updated as appropriate: allergies, current medications, past family history, past medical history, past social history, past surgical history and problem list.    Review of Systems   Constitutional: Negative for appetite change, chills, fatigue, fever and unexpected weight change.   Respiratory: Negative for cough, chest tightness, shortness of breath and wheezing.    Cardiovascular: Negative for chest pain, palpitations and leg swelling.   Gastrointestinal: Positive for abdominal pain, diarrhea and nausea. Negative for abdominal distention, anal bleeding, blood in stool, constipation, rectal pain and vomiting.   Psychiatric/Behavioral: Positive for agitation (stable). Negative for behavioral problems, confusion, decreased concentration, dysphoric mood, hallucinations, self-injury, sleep disturbance and suicidal ideas. The patient is nervous/anxious. The patient is not hyperactive.          Objective    Physical Exam   Constitutional: She is oriented to person, place, and time. She appears well-developed and  "well-nourished. No distress.   HENT:   Head: Normocephalic and atraumatic.   Mouth/Throat: Oropharynx is clear and moist.   Eyes: Conjunctivae and EOM are normal. Pupils are equal, round, and reactive to light.   Neck: Normal range of motion. Neck supple.   Cardiovascular: Normal rate, regular rhythm and normal heart sounds. Exam reveals no gallop and no friction rub.   No murmur heard.  Pulmonary/Chest: Effort normal and breath sounds normal. No respiratory distress. She has no wheezes. She has no rales.   Abdominal: Soft. Bowel sounds are normal. She exhibits no distension and no mass. There is tenderness (RUQ). There is no rebound and no guarding. No hernia.   Musculoskeletal: Normal range of motion. She exhibits no edema, tenderness or deformity.   Neurological: She is alert and oriented to person, place, and time. She displays normal reflexes. No cranial nerve deficit or sensory deficit. She exhibits normal muscle tone. Coordination normal.   Skin: Skin is warm and dry. Capillary refill takes less than 2 seconds. No rash noted. She is not diaphoretic.   Psychiatric: She has a normal mood and affect. Her behavior is normal. Judgment and thought content normal.   Nursing note and vitals reviewed.        /78   Pulse 64   Temp 98.6 °F (37 °C)   Ht 172.7 cm (67.99\")   Wt 114 kg (251 lb)   SpO2 99%   BMI 38.17 kg/m²     Nursing note and vitals reviewed.          Assessment/Plan   Delores was seen today for follow-up.    Diagnoses and all orders for this visit:    Generalized anxiety disorder  -     Begin: busPIRone (BUSPAR) 5 MG tablet; Take 1-2 tablets by mouth every 8 hours as needed.  -     TSH  -     Continue: sertraline (ZOLOFT) 50 MG tablet; Take 1 tablet by mouth Daily.    Upper abdominal pain  -     CBC & Differential  -     Comprehensive Metabolic Panel  -     Lipase    Postprandial diarrhea  -     CBC & Differential  -     Comprehensive Metabolic Panel  -     Lipase    NM HIDA scan scheduled for " tomorrow.

## 2019-03-27 ENCOUNTER — HOSPITAL ENCOUNTER (OUTPATIENT)
Dept: NUCLEAR MEDICINE | Facility: HOSPITAL | Age: 29
Discharge: HOME | End: 2019-03-27

## 2019-03-27 PROCEDURE — A9537 TC99M MEBROFENIN: HCPCS | Performed by: PHYSICIAN ASSISTANT

## 2019-03-27 PROCEDURE — 0 TECHNETIUM TC 99M MEBROFENIN KIT: Performed by: PHYSICIAN ASSISTANT

## 2019-03-27 PROCEDURE — 78227 HEPATOBIL SYST IMAGE W/DRUG: CPT

## 2019-03-27 PROCEDURE — 25010000002 SINCALIDE PER 5 MCG: Performed by: PHYSICIAN ASSISTANT

## 2019-03-27 RX ORDER — KIT FOR THE PREPARATION OF TECHNETIUM TC 99M MEBROFENIN 45 MG/10ML
1 INJECTION, POWDER, LYOPHILIZED, FOR SOLUTION INTRAVENOUS
Status: COMPLETED | OUTPATIENT
Start: 2019-03-27 | End: 2019-03-27

## 2019-03-27 RX ADMIN — SINCALIDE 2.3 MCG: 5 INJECTION, POWDER, LYOPHILIZED, FOR SOLUTION INTRAVENOUS at 14:30

## 2019-03-27 RX ADMIN — MEBROFENIN 1 DOSE: 45 INJECTION, POWDER, LYOPHILIZED, FOR SOLUTION INTRAVENOUS at 12:55

## 2019-03-28 DIAGNOSIS — F41.1 GENERALIZED ANXIETY DISORDER: ICD-10-CM

## 2019-03-28 DIAGNOSIS — R10.11 RUQ PAIN: ICD-10-CM

## 2019-03-28 DIAGNOSIS — K52.9 POSTPRANDIAL DIARRHEA: Primary | ICD-10-CM

## 2019-03-28 DIAGNOSIS — R94.8 ABNORMAL BILIARY HIDA SCAN: ICD-10-CM

## 2019-03-29 ENCOUNTER — TELEPHONE (OUTPATIENT)
Dept: INTERNAL MEDICINE | Facility: CLINIC | Age: 29
End: 2019-03-29

## 2019-03-29 NOTE — TELEPHONE ENCOUNTER
Patient called stating that Jojo told her to call if her pain had gotten worse due to her gallbladder. She states that her pain is much worse. She states she  is now  having pain under her right breast, her right side and near her kidney. She states she is having no shortness of breath. She would like a call back to discuss what she needs to do, please advise.

## 2019-03-29 NOTE — TELEPHONE ENCOUNTER
Please advise her to stick to drinking only water and keeping a very bland diet such as plain toast or a plain white potato to help minimize symptoms. If her pain is severe or if she has a fever she should consider going to the ER.

## 2019-04-01 ENCOUNTER — OFFICE VISIT (OUTPATIENT)
Dept: SURGERY | Facility: CLINIC | Age: 29
End: 2019-04-01

## 2019-04-01 VITALS
DIASTOLIC BLOOD PRESSURE: 84 MMHG | SYSTOLIC BLOOD PRESSURE: 134 MMHG | HEIGHT: 68 IN | WEIGHT: 247 LBS | HEART RATE: 61 BPM | TEMPERATURE: 97.6 F | BODY MASS INDEX: 37.44 KG/M2 | OXYGEN SATURATION: 99 %

## 2019-04-01 DIAGNOSIS — R10.11 RIGHT UPPER QUADRANT ABDOMINAL PAIN: ICD-10-CM

## 2019-04-01 DIAGNOSIS — K82.8 BILIARY DYSKINESIA: Primary | ICD-10-CM

## 2019-04-01 PROCEDURE — 99204 OFFICE O/P NEW MOD 45 MIN: CPT | Performed by: SURGERY

## 2019-04-01 NOTE — PROGRESS NOTES
Patient: Delores Sprague    YOB: 1990    Date: 04/01/2019    Primary Care Provider: Jojo Alex PA    Chief Complaint   Patient presents with   • Abdominal Pain       SUBJECTIVE:    History of present illness:  I saw the patient in the office today as a consultation for evaluation and treatment of abdominal pain, nausea, vomiting, diarrhea and constipation. HIDA was completed that showed EF 17.1.  Patient did have reproduction of symptoms with Kinevac injection.  Symptoms have been going on for over 3 months and are daily.  Especially the following fatty foods and milk products.  No significant diarrhea or reflux symptoms.  No rectal bleeding or dark stools.    The following portions of the patient's history were reviewed and updated as appropriate: allergies, current medications, past family history, past medical history, past social history, past surgical history and problem list.    Review of Systems   Constitutional: Negative for chills, fever and unexpected weight change.   HENT: Negative for hearing loss, trouble swallowing and voice change.    Eyes: Negative for visual disturbance.   Respiratory: Negative for apnea, cough, chest tightness, shortness of breath and wheezing.    Cardiovascular: Negative for chest pain, palpitations and leg swelling.   Gastrointestinal: Positive for abdominal pain, constipation, diarrhea and nausea. Negative for abdominal distention, anal bleeding, blood in stool, rectal pain and vomiting.   Endocrine: Negative for cold intolerance and heat intolerance.   Genitourinary: Negative for difficulty urinating, dysuria and flank pain.   Musculoskeletal: Negative for back pain and gait problem.   Skin: Negative for color change, rash and wound.   Neurological: Negative for dizziness, syncope, speech difficulty, weakness, light-headedness, numbness and headaches.   Hematological: Negative for adenopathy. Does not bruise/bleed easily.    Psychiatric/Behavioral: Negative for confusion. The patient is not nervous/anxious.        History:  Past Medical History:   Diagnosis Date   • Arthritis    • Depression    • Diverticulosis    • GERD (gastroesophageal reflux disease)    • Lupus        Past Surgical History:   Procedure Laterality Date   •  SECTION     • SALPINGECTOMY Bilateral        Family History   Problem Relation Age of Onset   • Diabetes Mother    • Hypertension Mother    • Hyperlipidemia Mother    • Diabetes Father    • Mental illness Father    • Pancreatic cancer Paternal Grandfather    • Liver cancer Other    • Breast cancer Other    • Uterine cancer Other    • Lung cancer Other        Social History     Tobacco Use   • Smoking status: Never Smoker   • Smokeless tobacco: Never Used   Substance Use Topics   • Alcohol use: No   • Drug use: No       Allergies:  No Known Allergies    Medications:    Current Outpatient Medications:   •  albuterol sulfate  (90 Base) MCG/ACT inhaler, Inhale 2 puffs Every 4 (Four) Hours As Needed for Wheezing or Shortness of Air., Disp: 1 inhaler, Rfl: 11  •  busPIRone (BUSPAR) 5 MG tablet, Take 1-2 tablets by mouth every 8 hours as needed., Disp: 180 tablet, Rfl: 5  •  cetirizine (zyrTEC) 10 MG tablet, Take 1 tablet by mouth Daily., Disp: 30 tablet, Rfl: 2  •  fluticasone (FLONASE) 50 MCG/ACT nasal spray, 1-2 sprays each nostril daily, Disp: 1 bottle, Rfl: 2  •  pantoprazole (PROTONIX) 40 MG EC tablet, Take 1 tablet by mouth Daily., Disp: 30 tablet, Rfl: 2  •  promethazine (PHENERGAN) 25 MG tablet, Take 1 tablet by mouth Every 6 (Six) Hours As Needed for Nausea or Vomiting (migraine onset)., Disp: 20 tablet, Rfl: 0  •  sertraline (ZOLOFT) 50 MG tablet, Take 1.5 tablets by mouth Daily., Disp: 135 tablet, Rfl: 3  •  SUMAtriptan (IMITREX) 50 MG tablet, Take one tablet at onset of headache. May repeat dose one time in 2 hours if headache not relieved., Disp: 9 tablet, Rfl: 2    OBJECTIVE:    Vital  "Signs:   Vitals:    04/01/19 1302   BP: 134/84   Pulse: 61   Temp: 97.6 °F (36.4 °C)   SpO2: 99%   Weight: 112 kg (247 lb)   Height: 172.7 cm (67.99\")       Physical Exam:   General Appearance:    Alert, cooperative, in no acute distress   Head:    Normocephalic, without obvious abnormality, atraumatic   Eyes:            Lids and lashes normal, conjunctivae and sclerae normal, no   icterus, no pallor, corneas clear, PERRLA   Ears:    Ears appear intact with no abnormalities noted   Throat:   No oral lesions, no thrush, oral mucosa moist   Neck:   No adenopathy, supple, trachea midline, no thyromegaly, no   carotid bruit, no JVD   Lungs:     Clear to auscultation,respirations regular, even and                  unlabored    Heart:    Regular rhythm and normal rate, normal S1 and S2, no            murmur   Abdomen:     no masses, no organomegaly, soft non-tender, non-distended, no guarding, there is evidence of right upper quadrant tenderness   Extremities:   Moves all extremities well, no edema, no cyanosis, no             redness   Pulses:   Pulses palpable and equal bilaterally   Skin:   No bleeding, bruising or rash   Lymph nodes:   No palpable adenopathy   Neurologic:   Cranial nerves 2 - 12 grossly intact, sensation intact      Results Review:   I reviewed the patient's new clinical results.    Review of Systems was reviewed and confirmed as accurate today.    ASSESSMENT/PLAN:    1. Biliary dyskinesia    2. Right upper quadrant abdominal pain        I had a detailed and extensive discussion with the patient in the office and they understand that they need to undergo laparoscopic cholecystectomy with intraoperative cholangiography or possible open cholecystectomy. Full risks and benefits of operative versus nonoperative intervention were discussed with the patient and these included things such as nonresolution of symptoms and possible worsening of symptoms without surgical intervention versus infection, bleeding, " open cholecystectomy, common bile duct injury, postoperative biliary leakage, need for drain placement, possible inability to perform cholangiography due to inflammation, postoperative abscess, etc with surgical intervention. The patient understands, agrees, and wishes to proceed with the surgical treatment plan as mentioned above. The patient had no questions for me at the end of the discussion.  I did draw a picture of the anatomy for the patient and used this in my informed consent.     I discussed the patients findings and my recommendations with patient.    Electronically signed by Alex Eugene MD  04/01/19

## 2019-04-02 ENCOUNTER — OUTSIDE FACILITY SERVICE (OUTPATIENT)
Dept: SURGERY | Facility: CLINIC | Age: 29
End: 2019-04-02

## 2019-04-02 PROCEDURE — 47563 LAPARO CHOLECYSTECTOMY/GRAPH: CPT | Performed by: SURGERY

## 2019-04-10 ENCOUNTER — OFFICE VISIT (OUTPATIENT)
Dept: SURGERY | Facility: CLINIC | Age: 29
End: 2019-04-10

## 2019-04-10 VITALS
HEIGHT: 68 IN | OXYGEN SATURATION: 98 % | BODY MASS INDEX: 37.56 KG/M2 | WEIGHT: 247.8 LBS | SYSTOLIC BLOOD PRESSURE: 130 MMHG | HEART RATE: 78 BPM | DIASTOLIC BLOOD PRESSURE: 78 MMHG | TEMPERATURE: 98 F

## 2019-04-10 DIAGNOSIS — Z48.89 POSTOPERATIVE VISIT: Primary | ICD-10-CM

## 2019-04-10 PROCEDURE — 99024 POSTOP FOLLOW-UP VISIT: CPT | Performed by: SURGERY

## 2019-04-10 NOTE — PROGRESS NOTES
"Patient: Delores Sprague    YOB: 1990    Date: 04/10/2019    Primary Care Provider: Jojo Alex PA    Reason for Consultation: Follow-up lap merna    Chief Complaint   Patient presents with   • Post-op     s/p lap merna       History of present illness:  I saw the patient in the office today as a followup from their recent laparoscopic cholecystectomy.  They state that they have done well and are having no complaints.    The following portions of the patient's history were reviewed and updated as appropriate: allergies, current medications, past family history, past medical history, past social history, past surgical history and problem list.      Vital Signs:   Vitals:    04/10/19 0900   BP: 130/78   Pulse: 78   Temp: 98 °F (36.7 °C)   SpO2: 98%   Weight: 112 kg (247 lb 12.8 oz)   Height: 172.7 cm (67.99\")       Physical Exam:   General Appearance:    Alert, cooperative, in no acute distress   Abdomen:     no masses, no organomegaly, soft non-tender, non-distended, no guarding, wounds are well healed     Assessment / Plan :    1. Postoperative visit        I did discuss the situation with the patient today in the office and they have done well from their recent laparoscopic cholecystectomy.  I have released the patient back to normal activity, they understand that they need to be careful about heavy lifting.  I need to see the patient back in the office only if they are having further problems, they know to call me if they are.    Electronically signed by Alex Eugene MD  04/10/19                "

## 2019-06-23 ENCOUNTER — HOSPITAL ENCOUNTER (EMERGENCY)
Facility: HOSPITAL | Age: 29
Discharge: HOME | End: 2019-06-23
Attending: EMERGENCY MEDICINE | Admitting: EMERGENCY MEDICINE

## 2019-06-23 VITALS
BODY MASS INDEX: 39.1 KG/M2 | HEIGHT: 68 IN | HEART RATE: 62 BPM | DIASTOLIC BLOOD PRESSURE: 83 MMHG | OXYGEN SATURATION: 95 % | RESPIRATION RATE: 18 BRPM | TEMPERATURE: 98.7 F | SYSTOLIC BLOOD PRESSURE: 130 MMHG | WEIGHT: 258 LBS

## 2019-06-23 DIAGNOSIS — R07.9 CHEST PAIN, UNSPECIFIED TYPE: ICD-10-CM

## 2019-06-23 DIAGNOSIS — Z91.89 H/O ANXIETY STATE: ICD-10-CM

## 2019-06-23 DIAGNOSIS — M76.812 ANTERIOR TIBIALIS TENDINITIS OF LEFT LOWER EXTREMITY: Primary | ICD-10-CM

## 2019-06-23 LAB
ALBUMIN SERPL-MCNC: 4.4 G/DL (ref 3.5–5)
ALBUMIN/GLOB SERPL: 1.4 G/DL (ref 1–2)
ALP SERPL-CCNC: 78 U/L (ref 38–126)
ALT SERPL W P-5'-P-CCNC: 51 U/L (ref 13–69)
ANION GAP SERPL CALCULATED.3IONS-SCNC: 14.3 MMOL/L (ref 10–20)
AST SERPL-CCNC: 38 U/L (ref 15–46)
BASOPHILS # BLD AUTO: 0.08 10*3/MM3 (ref 0–0.2)
BASOPHILS NFR BLD AUTO: 0.6 % (ref 0–1.5)
BILIRUB SERPL-MCNC: 0.3 MG/DL (ref 0.2–1.3)
BUN BLD-MCNC: 14 MG/DL (ref 7–20)
BUN/CREAT SERPL: 20 (ref 7.1–23.5)
CALCIUM SPEC-SCNC: 9.2 MG/DL (ref 8.4–10.2)
CHLORIDE SERPL-SCNC: 102 MMOL/L (ref 98–107)
CO2 SERPL-SCNC: 27 MMOL/L (ref 26–30)
CREAT BLD-MCNC: 0.7 MG/DL (ref 0.6–1.3)
D DIMER PPP FEU-MCNC: 0.37 MCGFEU/ML (ref 0–0.57)
DEPRECATED RDW RBC AUTO: 42.1 FL (ref 37–54)
EOSINOPHIL # BLD AUTO: 0.96 10*3/MM3 (ref 0–0.4)
EOSINOPHIL NFR BLD AUTO: 7.5 % (ref 0.3–6.2)
ERYTHROCYTE [DISTWIDTH] IN BLOOD BY AUTOMATED COUNT: 13.8 % (ref 12.3–15.4)
GFR SERPL CREATININE-BSD FRML MDRD: 99 ML/MIN/1.73
GLOBULIN UR ELPH-MCNC: 3.2 GM/DL
GLUCOSE BLD-MCNC: 86 MG/DL (ref 74–98)
HCT VFR BLD AUTO: 44.3 % (ref 34–46.6)
HGB BLD-MCNC: 14.9 G/DL (ref 12–15.9)
IMM GRANULOCYTES # BLD AUTO: 0.04 10*3/MM3 (ref 0–0.05)
IMM GRANULOCYTES NFR BLD AUTO: 0.3 % (ref 0–0.5)
LYMPHOCYTES # BLD AUTO: 4.23 10*3/MM3 (ref 0.7–3.1)
LYMPHOCYTES NFR BLD AUTO: 32.9 % (ref 19.6–45.3)
MCH RBC QN AUTO: 28.2 PG (ref 26.6–33)
MCHC RBC AUTO-ENTMCNC: 33.6 G/DL (ref 31.5–35.7)
MCV RBC AUTO: 83.9 FL (ref 79–97)
MONOCYTES # BLD AUTO: 0.8 10*3/MM3 (ref 0.1–0.9)
MONOCYTES NFR BLD AUTO: 6.2 % (ref 5–12)
NEUTROPHILS # BLD AUTO: 6.75 10*3/MM3 (ref 1.7–7)
NEUTROPHILS NFR BLD AUTO: 52.5 % (ref 42.7–76)
NRBC BLD AUTO-RTO: 0 /100 WBC (ref 0–0.2)
PLATELET # BLD AUTO: 337 10*3/MM3 (ref 140–450)
PMV BLD AUTO: 10 FL (ref 6–12)
POTASSIUM BLD-SCNC: 4.3 MMOL/L (ref 3.5–5.1)
PROT SERPL-MCNC: 7.6 G/DL (ref 6.3–8.2)
RBC # BLD AUTO: 5.28 10*6/MM3 (ref 3.77–5.28)
SODIUM BLD-SCNC: 139 MMOL/L (ref 137–145)
TROPONIN I SERPL-MCNC: <0.012 NG/ML (ref 0–0.03)
WBC NRBC COR # BLD: 12.86 10*3/MM3 (ref 3.4–10.8)

## 2019-06-23 PROCEDURE — 93005 ELECTROCARDIOGRAM TRACING: CPT | Performed by: EMERGENCY MEDICINE

## 2019-06-23 PROCEDURE — 93005 ELECTROCARDIOGRAM TRACING: CPT | Performed by: PHYSICIAN ASSISTANT

## 2019-06-23 PROCEDURE — 96372 THER/PROPH/DIAG INJ SC/IM: CPT

## 2019-06-23 PROCEDURE — 25010000002 METHYLPREDNISOLONE PER 80 MG: Performed by: PHYSICIAN ASSISTANT

## 2019-06-23 PROCEDURE — 96374 THER/PROPH/DIAG INJ IV PUSH: CPT

## 2019-06-23 PROCEDURE — 80053 COMPREHEN METABOLIC PANEL: CPT | Performed by: PHYSICIAN ASSISTANT

## 2019-06-23 PROCEDURE — 84484 ASSAY OF TROPONIN QUANT: CPT | Performed by: PHYSICIAN ASSISTANT

## 2019-06-23 PROCEDURE — 85379 FIBRIN DEGRADATION QUANT: CPT | Performed by: PHYSICIAN ASSISTANT

## 2019-06-23 PROCEDURE — 85025 COMPLETE CBC W/AUTO DIFF WBC: CPT | Performed by: PHYSICIAN ASSISTANT

## 2019-06-23 PROCEDURE — 25010000002 KETOROLAC TROMETHAMINE PER 15 MG: Performed by: PHYSICIAN ASSISTANT

## 2019-06-23 PROCEDURE — 99283 EMERGENCY DEPT VISIT LOW MDM: CPT

## 2019-06-23 RX ORDER — KETOROLAC TROMETHAMINE 30 MG/ML
30 INJECTION, SOLUTION INTRAMUSCULAR; INTRAVENOUS ONCE
Status: COMPLETED | OUTPATIENT
Start: 2019-06-23 | End: 2019-06-23

## 2019-06-23 RX ORDER — KETOROLAC TROMETHAMINE 30 MG/ML
60 INJECTION, SOLUTION INTRAMUSCULAR; INTRAVENOUS ONCE
Status: DISCONTINUED | OUTPATIENT
Start: 2019-06-23 | End: 2019-06-23

## 2019-06-23 RX ORDER — METHYLPREDNISOLONE ACETATE 80 MG/ML
80 INJECTION, SUSPENSION INTRA-ARTICULAR; INTRALESIONAL; INTRAMUSCULAR; SOFT TISSUE ONCE
Status: COMPLETED | OUTPATIENT
Start: 2019-06-23 | End: 2019-06-23

## 2019-06-23 RX ORDER — LORAZEPAM 0.5 MG/1
1 TABLET ORAL ONCE
Status: COMPLETED | OUTPATIENT
Start: 2019-06-23 | End: 2019-06-23

## 2019-06-23 RX ORDER — HYDROCODONE BITARTRATE AND ACETAMINOPHEN 5; 325 MG/1; MG/1
1 TABLET ORAL ONCE
Status: COMPLETED | OUTPATIENT
Start: 2019-06-23 | End: 2019-06-23

## 2019-06-23 RX ADMIN — HYDROCODONE BITARTRATE AND ACETAMINOPHEN 1 TABLET: 5; 325 TABLET ORAL at 18:11

## 2019-06-23 RX ADMIN — METHYLPREDNISOLONE ACETATE 80 MG: 80 INJECTION, SUSPENSION INTRA-ARTICULAR; INTRALESIONAL; INTRAMUSCULAR; SOFT TISSUE at 16:59

## 2019-06-23 RX ADMIN — KETOROLAC TROMETHAMINE 30 MG: 30 INJECTION, SOLUTION INTRAMUSCULAR at 16:59

## 2019-06-23 RX ADMIN — LORAZEPAM 1 MG: 0.5 TABLET ORAL at 18:11

## 2019-06-24 ENCOUNTER — OFFICE VISIT (OUTPATIENT)
Dept: INTERNAL MEDICINE | Facility: CLINIC | Age: 29
End: 2019-06-24

## 2019-06-24 VITALS
DIASTOLIC BLOOD PRESSURE: 84 MMHG | OXYGEN SATURATION: 99 % | WEIGHT: 261 LBS | BODY MASS INDEX: 39.56 KG/M2 | HEART RATE: 91 BPM | TEMPERATURE: 98.2 F | SYSTOLIC BLOOD PRESSURE: 136 MMHG | HEIGHT: 68 IN

## 2019-06-24 DIAGNOSIS — G43.109 MIGRAINE WITH AURA AND WITHOUT STATUS MIGRAINOSUS, NOT INTRACTABLE: ICD-10-CM

## 2019-06-24 DIAGNOSIS — J06.9 ACUTE URI: ICD-10-CM

## 2019-06-24 DIAGNOSIS — M25.572 ACUTE LEFT ANKLE PAIN: Primary | ICD-10-CM

## 2019-06-24 DIAGNOSIS — J20.9 ACUTE BRONCHITIS, UNSPECIFIED ORGANISM: ICD-10-CM

## 2019-06-24 PROCEDURE — 99214 OFFICE O/P EST MOD 30 MIN: CPT | Performed by: PHYSICIAN ASSISTANT

## 2019-06-24 RX ORDER — CETIRIZINE HYDROCHLORIDE 10 MG/1
10 TABLET ORAL DAILY
Qty: 30 TABLET | Refills: 11 | Status: SHIPPED | OUTPATIENT
Start: 2019-06-24 | End: 2019-12-31 | Stop reason: SDUPTHER

## 2019-06-24 RX ORDER — FLUTICASONE PROPIONATE 50 MCG
SPRAY, SUSPENSION (ML) NASAL
Qty: 1 BOTTLE | Refills: 11 | OUTPATIENT
Start: 2019-06-24 | End: 2019-12-31

## 2019-06-24 RX ORDER — AZITHROMYCIN 250 MG/1
TABLET, FILM COATED ORAL
Qty: 6 TABLET | Refills: 0 | Status: SHIPPED | OUTPATIENT
Start: 2019-06-24 | End: 2019-07-15

## 2019-06-24 RX ORDER — ELETRIPTAN HYDROBROMIDE 40 MG/1
40 TABLET, FILM COATED ORAL ONCE AS NEEDED
Qty: 6 TABLET | Refills: 5 | OUTPATIENT
Start: 2019-06-24 | End: 2019-12-31

## 2019-06-24 NOTE — PROGRESS NOTES
Delores Sprague is a 28 y.o. female.     Subjective   History of Present Illness   Here today for follow up from ER visit yesterday due to around 1 day of acute pain in the left ankle with acutely worsened swelling. Pain was sharp and rated 8/10 but today is throbbing and rated 5/10.  She does chronically have some swelling in the left ankle. She was given toradol and methylprenisalone injections which have likely contributed to the improvement today. She also has pain with palpation of the left proximal lower leg.  No known injury.  No history of gout.  No erythema, edema or warmth.    Today she also reports around 1 week of persistent nasal congestion and sinus pressure with 1 day of cough.  She has been experiencing increased chest tightness, SOA and wheezing for a couple of weeks which also seems worse in the last couple of days requiring increased use of her albuterol inhaler.  She has been using Flonase periodically but is not taking any antihistamines.  No fever or chills.    Migraine headaches were previously not bothersome for a while but returned around 2 months ago and imitrex is no longer helping. She is experiencing around 2 migraines per month which last up to 7 days.  She experiences photophobia and aura with migraines but denies any recent associated nausea.          The following portions of the patient's history were reviewed and updated as appropriate: allergies, current medications, past family history, past medical history, past social history, past surgical history and problem list.    Review of Systems   Constitutional: Negative for appetite change, chills, diaphoresis, fatigue, fever and unexpected weight change.   HENT: Positive for congestion, ear pain, postnasal drip, rhinorrhea, sinus pressure and sinus pain. Negative for dental problem, ear discharge, facial swelling, mouth sores, nosebleeds, sore throat, trouble swallowing and voice change.    Eyes: Positive for photophobia and  visual disturbance. Negative for pain and itching.   Respiratory: Positive for cough, chest tightness, shortness of breath and wheezing. Negative for apnea.    Cardiovascular: Negative for chest pain, palpitations and leg swelling.   Musculoskeletal: Positive for arthralgias and gait problem (foot pain). Negative for neck pain and neck stiffness.   Skin: Negative.  Negative for color change, pallor, rash and wound.   Neurological: Positive for headaches. Negative for dizziness, tremors, seizures, syncope, facial asymmetry, speech difficulty, weakness, light-headedness and numbness.   Hematological: Negative for adenopathy. Does not bruise/bleed easily.   Psychiatric/Behavioral: Negative for agitation, behavioral problems, dysphoric mood, self-injury and suicidal ideas. The patient is nervous/anxious.          Objective    Physical Exam   Constitutional: She is oriented to person, place, and time. She appears well-developed and well-nourished. No distress.   HENT:   Head: Normocephalic and atraumatic.   Mouth/Throat: No oropharyngeal exudate.   Bilateral TM effusions.  Clear postnasal drip.  Mild OP erythema.  Minimal sinus tenderness.   Eyes: Conjunctivae and EOM are normal. Pupils are equal, round, and reactive to light. Right eye exhibits no discharge. Left eye exhibits no discharge.   Neck: Normal range of motion. Neck supple.   Cardiovascular: Normal rate, regular rhythm and normal heart sounds. Exam reveals no gallop and no friction rub.   No murmur heard.  Pulmonary/Chest: Effort normal. No respiratory distress. She has wheezes ( Diffuse.). She has no rales.   Musculoskeletal: She exhibits tenderness ( Proximal left foot and distal lower leg tenderness midline.  Tenderness inferior and medial to the patella.). She exhibits no edema or deformity.   Mild decrease in left ankle ROM due to pain.   Lymphadenopathy:     She has no cervical adenopathy.   Neurological: She is alert and oriented to person, place, and  "time. She displays normal reflexes. No cranial nerve deficit or sensory deficit.   Skin: Skin is warm and dry. No rash noted. She is not diaphoretic. No erythema. No pallor.   Psychiatric: She has a normal mood and affect. Her behavior is normal. Judgment and thought content normal.   Nursing note and vitals reviewed.        /84   Pulse 91   Temp 98.2 °F (36.8 °C)   Ht 172.7 cm (67.99\")   Wt 118 kg (261 lb)   SpO2 99%   BMI 39.69 kg/m²     Nursing note and vitals reviewed.        Assessment/Plan   Delores was seen today for follow-up.    Diagnoses and all orders for this visit:    Acute left ankle pain  -     XR ankle 3+ vw left    ER record reviewed.  Negative d-dimer.  Encouraged rest, ice, elevation and compression until symptoms resolve.     Acute bronchitis, unspecified organism  Acute URI  -     azithromycin (ZITHROMAX Z-ROSY) 250 MG tablet; Take 2 tablets the first day, then 1 tablet daily for 4 days.  -     cetirizine (zyrTEC) 10 MG tablet; Take 1 tablet by mouth Daily.  -     fluticasone (FLONASE) 50 MCG/ACT nasal spray; 1-2 sprays each nostril daily    Migraine with aura and without status migrainosus, not intractable  -     eletriptan (RELPAX) 40 MG tablet; Take 1 tablet by mouth 1 (One) Time As Needed for Migraine for up to 1 dose. may repeat in 2 hours if necessary  Change to Relpax from Imitrex due to lack of efficacy.      Call or return to clinic if symptoms worsen or persist.         "

## 2019-06-28 ENCOUNTER — HOSPITAL ENCOUNTER (OUTPATIENT)
Dept: GENERAL RADIOLOGY | Facility: HOSPITAL | Age: 29
Discharge: HOME | End: 2019-06-28
Admitting: PHYSICIAN ASSISTANT

## 2019-06-28 PROCEDURE — 73610 X-RAY EXAM OF ANKLE: CPT

## 2019-07-15 ENCOUNTER — OFFICE VISIT (OUTPATIENT)
Dept: INTERNAL MEDICINE | Facility: CLINIC | Age: 29
End: 2019-07-15

## 2019-07-15 VITALS
HEART RATE: 53 BPM | SYSTOLIC BLOOD PRESSURE: 118 MMHG | OXYGEN SATURATION: 98 % | DIASTOLIC BLOOD PRESSURE: 74 MMHG | BODY MASS INDEX: 39.07 KG/M2 | HEIGHT: 68 IN | WEIGHT: 257.8 LBS | TEMPERATURE: 96.9 F | RESPIRATION RATE: 18 BRPM

## 2019-07-15 DIAGNOSIS — J21.9 ACUTE BRONCHITIS AND BRONCHIOLITIS: Primary | ICD-10-CM

## 2019-07-15 DIAGNOSIS — R06.09 DYSPNEA ON EXERTION: ICD-10-CM

## 2019-07-15 DIAGNOSIS — Z11.1 SCREENING FOR TUBERCULOSIS: ICD-10-CM

## 2019-07-15 DIAGNOSIS — J20.9 ACUTE BRONCHITIS AND BRONCHIOLITIS: Primary | ICD-10-CM

## 2019-07-15 PROCEDURE — 99213 OFFICE O/P EST LOW 20 MIN: CPT | Performed by: PHYSICIAN ASSISTANT

## 2019-07-15 PROCEDURE — 96372 THER/PROPH/DIAG INJ SC/IM: CPT | Performed by: PHYSICIAN ASSISTANT

## 2019-07-15 PROCEDURE — 86580 TB INTRADERMAL TEST: CPT | Performed by: PHYSICIAN ASSISTANT

## 2019-07-15 RX ORDER — METHYLPREDNISOLONE 4 MG/1
TABLET ORAL
Qty: 1 EACH | Refills: 0 | Status: SHIPPED | OUTPATIENT
Start: 2019-07-15 | End: 2019-07-22

## 2019-07-15 RX ORDER — METHYLPREDNISOLONE ACETATE 80 MG/ML
80 INJECTION, SUSPENSION INTRA-ARTICULAR; INTRALESIONAL; INTRAMUSCULAR; SOFT TISSUE ONCE
Status: COMPLETED | OUTPATIENT
Start: 2019-07-15 | End: 2019-07-15

## 2019-07-15 RX ORDER — MONTELUKAST SODIUM 10 MG/1
10 TABLET ORAL NIGHTLY
Qty: 30 TABLET | Refills: 5 | Status: SHIPPED | OUTPATIENT
Start: 2019-07-15 | End: 2021-01-07

## 2019-07-15 RX ADMIN — METHYLPREDNISOLONE ACETATE 80 MG: 80 INJECTION, SUSPENSION INTRA-ARTICULAR; INTRALESIONAL; INTRAMUSCULAR; SOFT TISSUE at 08:32

## 2019-07-15 NOTE — PROGRESS NOTES
Delores Sprague is a 28 y.o. female.     Subjective   History of Present Illness   Here today with concern of continued difficulty breathing, cough and chest tightness which have been ongoing for around 1 month.  Cough is worse in the morning and evening but also when trying to exercise.   3 weeks ago she received a depo-medrol injection from the ER then the next day received Zithromax here. She felt some better initially from the steroid then even better after 2 days of Zithromax but worse thereafter, which again worsened in the last 3 days. She has been using an albuterol inhaler which sometimes makes her cough a little worse but the chest tightness a little better.  She denies fever, chills, nasal congestion, rhinorrhea, postnasal drip, headache, dizziness, sore throat or swallowing troubles.  She has had similar symptoms on 2 previous occasions. No known history of asthma, environmental allergies or chronic bronchitis.         The following portions of the patient's history were reviewed and updated as appropriate: allergies, current medications, past family history, past medical history, past social history, past surgical history and problem list.    Review of Systems   Constitutional: Negative for appetite change, chills, fatigue, fever and unexpected weight change.   HENT: Negative for congestion, drooling, ear discharge, facial swelling, mouth sores, postnasal drip, rhinorrhea, sinus pressure, sneezing, sore throat and trouble swallowing.    Eyes: Negative for visual disturbance.   Respiratory: Positive for cough, chest tightness, shortness of breath and wheezing. Negative for apnea, choking and stridor.    Cardiovascular: Negative for chest pain, palpitations and leg swelling.   Allergic/Immunologic: Negative for environmental allergies and immunocompromised state.   Neurological: Negative for dizziness, tremors, syncope, speech difficulty, weakness, numbness and headaches.   Hematological: Negative  "for adenopathy. Does not bruise/bleed easily.   Psychiatric/Behavioral: Negative for agitation, confusion, dysphoric mood, self-injury and suicidal ideas. The patient is not nervous/anxious.          Objective    Physical Exam   Constitutional: She is oriented to person, place, and time. She appears well-developed and well-nourished. No distress.   HENT:   Head: Normocephalic and atraumatic.   Right Ear: External ear normal.   Left Ear: External ear normal.   Mouth/Throat: Oropharynx is clear and moist.   Eyes: Conjunctivae and EOM are normal. Pupils are equal, round, and reactive to light.   Neck: Normal range of motion. Neck supple.   Cardiovascular: Normal rate, regular rhythm and normal heart sounds. Exam reveals no gallop and no friction rub.   No murmur heard.  Pulmonary/Chest: Effort normal. No stridor. No respiratory distress. She has wheezes (faint, scattered). She has rales (faint, scattered). She exhibits no tenderness.   Abdominal: Soft. Bowel sounds are normal. There is no tenderness.   Musculoskeletal: Normal range of motion. She exhibits no edema, tenderness or deformity.   Lymphadenopathy:     She has no cervical adenopathy.   Neurological: She is alert and oriented to person, place, and time. She displays normal reflexes. No cranial nerve deficit or sensory deficit. She exhibits normal muscle tone. Coordination normal.   Skin: Skin is warm and dry. Capillary refill takes less than 2 seconds. No rash noted. She is not diaphoretic.   Psychiatric: She has a normal mood and affect. Her behavior is normal. Judgment and thought content normal.   Nursing note and vitals reviewed.        /74   Pulse 53   Temp 96.9 °F (36.1 °C) (Temporal)   Resp 18   Ht 172.7 cm (67.99\")   Wt 117 kg (257 lb 12.8 oz)   SpO2 98%   BMI 39.21 kg/m²     Nursing note and vitals reviewed.          Assessment/Plan   Delores was seen today for cough and bronchitis.    Diagnoses and all orders for this visit:    Acute " bronchitis and bronchiolitis  -     montelukast (SINGULAIR) 10 MG tablet; Take 1 tablet by mouth Every Night.  -     methylPREDNISolone acetate (DEPO-medrol) injection 80 mg  -     methylPREDNISolone (MEDROL, ROSY,) 4 MG tablet; Take as directed on package instructions.      Dyspnea on exertion  -     Spirometry With Bronchodilator      Continue albuterol inhaler q4h prn.       Call or RTC if symptoms worsen or persist.

## 2019-07-17 ENCOUNTER — CLINICAL SUPPORT (OUTPATIENT)
Dept: INTERNAL MEDICINE | Facility: CLINIC | Age: 29
End: 2019-07-17

## 2019-07-17 LAB
INDURATION: 0 MM (ref 0–10)
Lab: NORMAL
Lab: NORMAL
TB SKIN TEST: NEGATIVE

## 2019-07-22 ENCOUNTER — OFFICE VISIT (OUTPATIENT)
Dept: INTERNAL MEDICINE | Facility: CLINIC | Age: 29
End: 2019-07-22

## 2019-07-22 VITALS
HEART RATE: 89 BPM | HEIGHT: 68 IN | TEMPERATURE: 98.8 F | SYSTOLIC BLOOD PRESSURE: 136 MMHG | DIASTOLIC BLOOD PRESSURE: 82 MMHG | OXYGEN SATURATION: 98 % | BODY MASS INDEX: 39.21 KG/M2

## 2019-07-22 DIAGNOSIS — R53.83 FATIGUE, UNSPECIFIED TYPE: ICD-10-CM

## 2019-07-22 DIAGNOSIS — R05.3 PERSISTENT COUGH: ICD-10-CM

## 2019-07-22 DIAGNOSIS — R09.82 ALLERGIC RHINITIS WITH POSTNASAL DRIP: ICD-10-CM

## 2019-07-22 DIAGNOSIS — R06.09 DYSPNEA ON EXERTION: ICD-10-CM

## 2019-07-22 DIAGNOSIS — J30.9 ALLERGIC RHINITIS WITH POSTNASAL DRIP: ICD-10-CM

## 2019-07-22 DIAGNOSIS — B00.1 COLD SORE: Primary | ICD-10-CM

## 2019-07-22 PROCEDURE — 99213 OFFICE O/P EST LOW 20 MIN: CPT | Performed by: PHYSICIAN ASSISTANT

## 2019-07-22 RX ORDER — VALACYCLOVIR HYDROCHLORIDE 1 G/1
1000 TABLET, FILM COATED ORAL 2 TIMES DAILY
Qty: 14 TABLET | Refills: 0 | Status: SHIPPED | OUTPATIENT
Start: 2019-07-22 | End: 2019-07-29

## 2019-07-22 NOTE — PROGRESS NOTES
Delores Sprague is a 28 y.o. female.     Subjective   History of Present Illness   Here today with concern of continued cough, chest tigheness and SOA for nearly 3 weeks.  1 week ago she was prescribed a medrol dose pack which reduced coughing until after she finished it when the coughing fits returned.  No known fever.  She also has some wheezing at night as well as before and after coughing episodes. She has been experiencing rhinorrhea, nasal congestion and scratchy throat.  She developed a cold sore around 2 weeks ago followed by an additional cold sore and then an aphthous ulcer which is currently present.  Albuterol inhaler helps but she needs it around-the-clock.  She is taking Singulair nightly.        The following portions of the patient's history were reviewed and updated as appropriate: allergies, current medications, past family history, past medical history, past social history, past surgical history and problem list.    Review of Systems   Constitutional: Positive for chills. Negative for appetite change, fatigue, fever and unexpected weight change.   HENT: Positive for congestion, mouth sores, postnasal drip, rhinorrhea, sinus pressure and sore throat. Negative for drooling, ear pain, trouble swallowing and voice change.    Respiratory: Positive for cough, chest tightness, shortness of breath and wheezing. Negative for apnea, choking and stridor.    Cardiovascular: Negative for chest pain, palpitations and leg swelling.   Skin: Negative.    Neurological: Positive for headaches. Negative for dizziness, tremors, seizures, weakness and numbness.   Hematological: Negative for adenopathy. Does not bruise/bleed easily.   Psychiatric/Behavioral: Negative for agitation, dysphoric mood, self-injury and suicidal ideas. The patient is not nervous/anxious.          Objective    Physical Exam   Constitutional: She is oriented to person, place, and time. She appears well-developed and well-nourished. No  "distress.   Overweight.   HENT:   Head: Normocephalic and atraumatic.   Nose: Nose normal.   Mouth/Throat: No oropharyngeal exudate.   Bilateral TM effusions.  Clear postnasal drip.  No sinus tenderness.   Eyes: Conjunctivae and EOM are normal. Pupils are equal, round, and reactive to light. Right eye exhibits no discharge. Left eye exhibits no discharge.   Neck: Normal range of motion. Neck supple.   Cardiovascular: Normal rate, regular rhythm and normal heart sounds. Exam reveals no gallop and no friction rub.   No murmur heard.  Pulmonary/Chest: Effort normal and breath sounds normal. No stridor. No respiratory distress. She has no wheezes. She has no rales. She exhibits no tenderness.   Lymphadenopathy:     She has no cervical adenopathy.   Neurological: She is alert and oriented to person, place, and time. No cranial nerve deficit.   Skin: Skin is warm and dry. No rash noted. She is not diaphoretic.   Psychiatric: She has a normal mood and affect. Her behavior is normal. Judgment and thought content normal.   Nursing note and vitals reviewed.        /82   Pulse 89   Temp 98.8 °F (37.1 °C)   Ht 172.7 cm (67.99\")   SpO2 98%   BMI 39.21 kg/m²     Nursing note and vitals reviewed.          Assessment/Plan   Delores was seen today for follow-up.    Diagnoses and all orders for this visit:    Cold sore  -     valACYclovir (VALTREX) 1000 MG tablet; Take 1 tablet by mouth 2 (Two) Times a Day for 7 days.    Dyspnea on exertion  Fatigue, unspecified type  Persistent cough  -     XR Chest PA & Lateral  -     Breo 200-25 mcg 1 puff daily Sample provided. Advised to rinse mouth after each use.   Continue Singulair.  Spirometry with bronchodilator scheduled for 7/26/19.   Lung sounds have improved since Medrol Dosepak.    Allergic rhinitis with postnasal drip  Begin Zyrtec nightly.      Call or return to clinic if symptoms worsen or persist.       "

## 2019-07-26 ENCOUNTER — HOSPITAL ENCOUNTER (OUTPATIENT)
Dept: GENERAL RADIOLOGY | Facility: HOSPITAL | Age: 29
Discharge: HOME | End: 2019-07-26

## 2019-07-26 ENCOUNTER — HOSPITAL ENCOUNTER (OUTPATIENT)
Dept: PULMONOLOGY | Facility: HOSPITAL | Age: 29
Discharge: HOME | End: 2019-07-26
Admitting: PHYSICIAN ASSISTANT

## 2019-07-26 PROCEDURE — 94060 EVALUATION OF WHEEZING: CPT | Performed by: INTERNAL MEDICINE

## 2019-07-26 PROCEDURE — 71046 X-RAY EXAM CHEST 2 VIEWS: CPT

## 2019-07-26 PROCEDURE — 94060 EVALUATION OF WHEEZING: CPT

## 2019-07-26 PROCEDURE — 94640 AIRWAY INHALATION TREATMENT: CPT

## 2019-07-26 RX ORDER — ALBUTEROL SULFATE 2.5 MG/3ML
2.5 SOLUTION RESPIRATORY (INHALATION) ONCE
Status: COMPLETED | OUTPATIENT
Start: 2019-07-26 | End: 2019-07-26

## 2019-07-26 RX ADMIN — ALBUTEROL SULFATE 2.5 MG: 2.5 SOLUTION RESPIRATORY (INHALATION) at 16:38

## 2019-07-30 ENCOUNTER — TELEPHONE (OUTPATIENT)
Dept: INTERNAL MEDICINE | Facility: CLINIC | Age: 29
End: 2019-07-30

## 2019-07-30 DIAGNOSIS — J98.4 MILD OBSTRUCTION OF PULMONARY AIRFLOW: Primary | ICD-10-CM

## 2019-07-30 DIAGNOSIS — R06.09 DYSPNEA ON EXERTION: ICD-10-CM

## 2019-07-30 NOTE — TELEPHONE ENCOUNTER
Had 2 cold sores when you saw her, and now she has two inside of mouth, pt went to gym and had tightness/hard a deep breath, out of breath more, wants to talk to you

## 2019-08-07 ENCOUNTER — TELEPHONE (OUTPATIENT)
Dept: INTERNAL MEDICINE | Facility: CLINIC | Age: 29
End: 2019-08-07

## 2019-08-07 NOTE — TELEPHONE ENCOUNTER
Patient called stating that the issue with her lungs are getting a lot worse. Patient states that pulmonology isn't able to see her until October. Patient would like to know if there is anything else she can do. Please advise.

## 2019-08-08 NOTE — TELEPHONE ENCOUNTER
We will work on getting here a sooner appointment, even if it ends up being with a different pulmonologist.

## 2019-08-12 ENCOUNTER — OFFICE VISIT (OUTPATIENT)
Dept: INTERNAL MEDICINE | Facility: CLINIC | Age: 29
End: 2019-08-12

## 2019-08-12 ENCOUNTER — TELEPHONE (OUTPATIENT)
Dept: INTERNAL MEDICINE | Facility: CLINIC | Age: 29
End: 2019-08-12

## 2019-08-12 VITALS
SYSTOLIC BLOOD PRESSURE: 128 MMHG | DIASTOLIC BLOOD PRESSURE: 78 MMHG | HEART RATE: 97 BPM | HEIGHT: 68 IN | BODY MASS INDEX: 40.01 KG/M2 | WEIGHT: 264 LBS | TEMPERATURE: 98.6 F | OXYGEN SATURATION: 99 %

## 2019-08-12 DIAGNOSIS — J22 ACUTE LOWER RESPIRATORY TRACT INFECTION: ICD-10-CM

## 2019-08-12 DIAGNOSIS — R06.09 DYSPNEA ON EXERTION: ICD-10-CM

## 2019-08-12 DIAGNOSIS — J98.8 MILD AIRFLOW OBSTRUCTION ON PULMONARY FUNCTION TEST: ICD-10-CM

## 2019-08-12 DIAGNOSIS — R94.2 MILD AIRFLOW OBSTRUCTION ON PULMONARY FUNCTION TEST: ICD-10-CM

## 2019-08-12 DIAGNOSIS — J01.40 ACUTE NON-RECURRENT PANSINUSITIS: Primary | ICD-10-CM

## 2019-08-12 PROCEDURE — 96372 THER/PROPH/DIAG INJ SC/IM: CPT | Performed by: PHYSICIAN ASSISTANT

## 2019-08-12 PROCEDURE — 99213 OFFICE O/P EST LOW 20 MIN: CPT | Performed by: PHYSICIAN ASSISTANT

## 2019-08-12 RX ORDER — AZITHROMYCIN 250 MG/1
TABLET, FILM COATED ORAL
Qty: 6 TABLET | Refills: 0 | Status: SHIPPED | OUTPATIENT
Start: 2019-08-12 | End: 2019-08-30

## 2019-08-12 RX ORDER — CEFTRIAXONE 1 G/1
1 INJECTION, POWDER, FOR SOLUTION INTRAMUSCULAR; INTRAVENOUS ONCE
Status: COMPLETED | OUTPATIENT
Start: 2019-08-12 | End: 2019-08-12

## 2019-08-12 RX ORDER — BROMPHENIRAMINE MALEATE, PSEUDOEPHEDRINE HYDROCHLORIDE, AND DEXTROMETHORPHAN HYDROBROMIDE 2; 30; 10 MG/5ML; MG/5ML; MG/5ML
10 SYRUP ORAL 4 TIMES DAILY PRN
Qty: 200 ML | Refills: 0 | Status: SHIPPED | OUTPATIENT
Start: 2019-08-12 | End: 2019-10-09

## 2019-08-12 RX ADMIN — CEFTRIAXONE 1 G: 1 INJECTION, POWDER, FOR SOLUTION INTRAMUSCULAR; INTRAVENOUS at 11:29

## 2019-08-12 NOTE — TELEPHONE ENCOUNTER
Patient was seen earlier for a severe cough, she has called bacl stating the cough has gotten worse since earlier. Would like the nurse give her a call.

## 2019-08-12 NOTE — PROGRESS NOTES
Delores Sprague is a 28 y.o. female.     Subjective   History of Present Illness   Here today with concern of around 2 weeks of worsening intermittent right lower anterior rib pain with coughing, taking deep breaths, and sometimes with eating. She reports around 1 week of coughing up white sputum, rhinorrhea, nasal congestion, postnasal drip and sinus pressure.  She has been SOA with exertion for a few months which is no worse than previously.  She has had some chest tightness and wheezing.  She has also been feeling hot constantly since last night and had sweats over night.  She lost her voice over the weekend which has improved.  She had a chest x-ray 3 weeks ago which was unremarkable.  She is taking Singulair and Flonase but has not been taking Zyrtec. She is s/p cholecystectomy and denies nausea or diarrhea.     She was recently evaluated with spirometry due to persistent dyspnea on exertion. Spirometry result was abnormal and she has been referred to pulmonology due to uncontrolled symptoms and lack of revers ability with bronchodilators.         The following portions of the patient's history were reviewed and updated as appropriate: allergies, current medications, past family history, past medical history, past social history, past surgical history and problem list.    Review of Systems   Constitutional: Positive for diaphoresis and fever (feels feverish). Negative for appetite change, chills, fatigue and unexpected weight change.   HENT: Positive for congestion, postnasal drip, rhinorrhea, sinus pressure and voice change. Negative for dental problem, ear discharge, ear pain, hearing loss, nosebleeds, sinus pain, sore throat and tinnitus.    Eyes: Negative for visual disturbance.   Respiratory: Positive for cough, chest tightness, shortness of breath and wheezing.    Cardiovascular: Negative for chest pain, palpitations and leg swelling.   Gastrointestinal: Negative for abdominal distention, abdominal  pain, diarrhea, nausea, rectal pain and vomiting.   Genitourinary: Negative.    Musculoskeletal: Positive for arthralgias (right lower anterior ribs). Negative for back pain, myalgias and neck stiffness.   Allergic/Immunologic: Positive for environmental allergies. Negative for immunocompromised state.   Neurological: Negative for dizziness, tremors, syncope, facial asymmetry, speech difficulty, weakness, light-headedness and headaches.   Hematological: Negative for adenopathy. Does not bruise/bleed easily.   Psychiatric/Behavioral: Negative for agitation, behavioral problems, confusion, dysphoric mood, self-injury and suicidal ideas. The patient is not nervous/anxious.          Objective    Physical Exam   Constitutional: She is oriented to person, place, and time. She appears well-developed and well-nourished. No distress.   HENT:   Head: Normocephalic and atraumatic.   Mouth/Throat: No oropharyngeal exudate.   Bilateral TM effusions, left > right. Clear postnasal drip. Minimal diffuse sinus tenderness.    Eyes: Conjunctivae and EOM are normal. Pupils are equal, round, and reactive to light. Right eye exhibits no discharge. Left eye exhibits no discharge.   Neck: Normal range of motion. Neck supple.   Cardiovascular: Normal rate, regular rhythm, normal heart sounds and intact distal pulses. Exam reveals no gallop and no friction rub.   No murmur heard.  Pulmonary/Chest: Effort normal. No stridor. No respiratory distress. She has wheezes (few scattered). She has no rales. She exhibits tenderness (right lower anterior ribs).   Abdominal: Soft. Bowel sounds are normal. She exhibits no distension and no mass. There is no tenderness. No hernia.   Lymphadenopathy:     She has no cervical adenopathy.   Neurological: She is alert and oriented to person, place, and time. No cranial nerve deficit.   Skin: Skin is warm and dry. No rash noted. She is not diaphoretic. No erythema. No pallor.   Psychiatric: She has a normal  "mood and affect. Her behavior is normal. Judgment and thought content normal.   Nursing note and vitals reviewed.        /78   Pulse 97   Temp 98.6 °F (37 °C)   Ht 172.7 cm (67.99\")   Wt 120 kg (264 lb)   SpO2 99%   BMI 40.15 kg/m²     Nursing note and vitals reviewed.          Assessment/Plan   Delores was seen today for uri.    Diagnoses and all orders for this visit:    Acute non-recurrent pansinusitis    Acute lower respiratory tract infection  -     cefTRIAXone (ROCEPHIN) injection 1 g  -     azithromycin (ZITHROMAX Z-ROSY) 250 MG tablet; Take 2 tablets the first day, then 1 tablet daily for 4 days.    Dyspnea on exertion    Mild airflow obstruction on pulmonary function test    Continue Singular and Flonase. Resume Zyrtec daily.     She is scheduled with pulmonology in 2 weeks to establish care due to recent abnormal spirometry.         Call or RTC if symptoms worsen or persist.        "

## 2019-08-13 ENCOUNTER — TELEPHONE (OUTPATIENT)
Dept: INTERNAL MEDICINE | Facility: CLINIC | Age: 29
End: 2019-08-13

## 2019-08-13 NOTE — TELEPHONE ENCOUNTER
Pt  called, she is still coughing, and they know it has only been one day on med, but they wanted to know if she can have a letter for the rest of the week off

## 2019-08-15 ENCOUNTER — HOSPITAL ENCOUNTER (OUTPATIENT)
Dept: GENERAL RADIOLOGY | Facility: HOSPITAL | Age: 29
Discharge: HOME | End: 2019-08-15
Admitting: PHYSICIAN ASSISTANT

## 2019-08-15 DIAGNOSIS — J22 ACUTE LOWER RESPIRATORY TRACT INFECTION: ICD-10-CM

## 2019-08-15 DIAGNOSIS — R06.09 DYSPNEA ON EXERTION: Primary | ICD-10-CM

## 2019-08-15 PROCEDURE — 71046 X-RAY EXAM CHEST 2 VIEWS: CPT

## 2019-08-16 ENCOUNTER — OFFICE VISIT (OUTPATIENT)
Dept: PULMONOLOGY | Facility: CLINIC | Age: 29
End: 2019-08-16

## 2019-08-16 DIAGNOSIS — R06.09 DOE (DYSPNEA ON EXERTION): Primary | ICD-10-CM

## 2019-08-16 PROCEDURE — 94060 EVALUATION OF WHEEZING: CPT | Performed by: INTERNAL MEDICINE

## 2019-08-16 PROCEDURE — 94729 DIFFUSING CAPACITY: CPT | Performed by: INTERNAL MEDICINE

## 2019-08-16 PROCEDURE — 94726 PLETHYSMOGRAPHY LUNG VOLUMES: CPT | Performed by: INTERNAL MEDICINE

## 2019-08-16 RX ORDER — ALBUTEROL SULFATE 90 UG/1
4 AEROSOL, METERED RESPIRATORY (INHALATION) ONCE
Status: COMPLETED | OUTPATIENT
Start: 2019-08-16 | End: 2019-08-16

## 2019-08-16 RX ADMIN — ALBUTEROL SULFATE 4 PUFF: 90 AEROSOL, METERED RESPIRATORY (INHALATION) at 11:32

## 2019-08-21 ENCOUNTER — OFFICE VISIT (OUTPATIENT)
Dept: PULMONOLOGY | Facility: CLINIC | Age: 29
End: 2019-08-21

## 2019-08-21 VITALS
HEIGHT: 68 IN | WEIGHT: 266 LBS | OXYGEN SATURATION: 92 % | BODY MASS INDEX: 40.32 KG/M2 | TEMPERATURE: 97.4 F | DIASTOLIC BLOOD PRESSURE: 80 MMHG | HEART RATE: 57 BPM | SYSTOLIC BLOOD PRESSURE: 128 MMHG

## 2019-08-21 DIAGNOSIS — M32.9 HISTORY OF SYSTEMIC LUPUS ERYTHEMATOSUS (SLE) (HCC): ICD-10-CM

## 2019-08-21 DIAGNOSIS — R06.09 DOE (DYSPNEA ON EXERTION): ICD-10-CM

## 2019-08-21 DIAGNOSIS — J45.40 MODERATE PERSISTENT ASTHMA WITHOUT COMPLICATION: ICD-10-CM

## 2019-08-21 DIAGNOSIS — J45.40 MODERATE PERSISTENT ASTHMA WITHOUT COMPLICATION: Primary | ICD-10-CM

## 2019-08-21 DIAGNOSIS — J30.2 SEASONAL ALLERGIC RHINITIS, UNSPECIFIED TRIGGER: ICD-10-CM

## 2019-08-21 PROCEDURE — 86003 ALLG SPEC IGE CRUDE XTRC EA: CPT | Performed by: INTERNAL MEDICINE

## 2019-08-21 PROCEDURE — 86235 NUCLEAR ANTIGEN ANTIBODY: CPT | Performed by: INTERNAL MEDICINE

## 2019-08-21 PROCEDURE — 82785 ASSAY OF IGE: CPT | Performed by: INTERNAL MEDICINE

## 2019-08-21 PROCEDURE — 99204 OFFICE O/P NEW MOD 45 MIN: CPT | Performed by: INTERNAL MEDICINE

## 2019-08-21 PROCEDURE — 85652 RBC SED RATE AUTOMATED: CPT | Performed by: INTERNAL MEDICINE

## 2019-08-21 PROCEDURE — 86431 RHEUMATOID FACTOR QUANT: CPT | Performed by: INTERNAL MEDICINE

## 2019-08-21 PROCEDURE — 86038 ANTINUCLEAR ANTIBODIES: CPT | Performed by: INTERNAL MEDICINE

## 2019-08-21 PROCEDURE — 36415 COLL VENOUS BLD VENIPUNCTURE: CPT | Performed by: INTERNAL MEDICINE

## 2019-08-21 PROCEDURE — 85027 COMPLETE CBC AUTOMATED: CPT | Performed by: INTERNAL MEDICINE

## 2019-08-21 RX ORDER — BUDESONIDE AND FORMOTEROL FUMARATE DIHYDRATE 160; 4.5 UG/1; UG/1
2 AEROSOL RESPIRATORY (INHALATION) 2 TIMES DAILY
Qty: 1 INHALER | Refills: 5 | Status: SHIPPED | OUTPATIENT
Start: 2019-08-21 | End: 2019-08-21 | Stop reason: SDUPTHER

## 2019-08-21 RX ORDER — BUDESONIDE AND FORMOTEROL FUMARATE DIHYDRATE 160; 4.5 UG/1; UG/1
2 AEROSOL RESPIRATORY (INHALATION) 2 TIMES DAILY
Qty: 2 INHALER | Refills: 0 | COMMUNITY
Start: 2019-08-21 | End: 2019-08-29 | Stop reason: SDUPTHER

## 2019-08-22 LAB
BASOPHILS # BLD AUTO: 0.07 10*3/MM3 (ref 0–0.2)
BASOPHILS NFR BLD AUTO: 0.7 % (ref 0–1.5)
CHROMATIN AB SERPL-ACNC: <10 IU/ML (ref 0–14)
DEPRECATED RDW RBC AUTO: 46.8 FL (ref 37–54)
EOSINOPHIL # BLD AUTO: 0.26 10*3/MM3 (ref 0–0.4)
EOSINOPHIL NFR BLD AUTO: 2.5 % (ref 0.3–6.2)
ERYTHROCYTE [DISTWIDTH] IN BLOOD BY AUTOMATED COUNT: 14.3 % (ref 12.3–15.4)
ERYTHROCYTE [SEDIMENTATION RATE] IN BLOOD: 14 MM/HR (ref 0–20)
HCT VFR BLD AUTO: 48.1 % (ref 34–46.6)
HGB BLD-MCNC: 14.9 G/DL (ref 12–15.9)
LYMPHOCYTES # BLD AUTO: 3.08 10*3/MM3 (ref 0.7–3.1)
LYMPHOCYTES NFR BLD AUTO: 29.6 % (ref 19.6–45.3)
MCH RBC QN AUTO: 27.8 PG (ref 26.6–33)
MCHC RBC AUTO-ENTMCNC: 31 G/DL (ref 31.5–35.7)
MCV RBC AUTO: 89.7 FL (ref 79–97)
MONOCYTES # BLD AUTO: 0.54 10*3/MM3 (ref 0.1–0.9)
MONOCYTES NFR BLD AUTO: 5.2 % (ref 5–12)
NEUTROPHILS # BLD AUTO: 6.4 10*3/MM3 (ref 1.7–7)
NEUTROPHILS NFR BLD AUTO: 61.4 % (ref 42.7–76)
PLATELET # BLD AUTO: 408 10*3/MM3 (ref 140–450)
PMV BLD AUTO: 10.4 FL (ref 6–12)
RBC # BLD AUTO: 5.36 10*6/MM3 (ref 3.77–5.28)
WBC NRBC COR # BLD: 10.41 10*3/MM3 (ref 3.4–10.8)

## 2019-08-23 LAB
ANA SER QL: NEGATIVE
ENA SS-A AB SER-ACNC: <0.2 AI (ref 0–0.9)
ENA SS-B AB SER-ACNC: <0.2 AI (ref 0–0.9)

## 2019-08-25 LAB
A ALTERNATA IGE QN: <0.1 KU/L
A FUMIGATUS IGE QN: <0.1 KU/L
AMER ROACH IGE QN: <0.1 KU/L
BAHIA GRASS IGE QN: <0.1 KU/L
BERMUDA GRASS IGE QN: <0.1 KU/L
BOXELDER IGE QN: <0.1 KU/L
C HERBARUM IGE QN: <0.1 KU/L
CAT DANDER IGG QN: <0.1 KU/L
CMN PIGWEED IGE QN: <0.1 KU/L
COMMON RAGWEED IGE QN: <0.1 KU/L
CONV CLASS DESCRIPTION: NORMAL
D FARINAE IGE QN: <0.1 KU/L
D PTERONYSS IGE QN: <0.1 KU/L
DOG DANDER IGE QN: <0.1 KU/L
ENGL PLANTAIN IGE QN: <0.1 KU/L
HAZELNUT POLN IGE QN: <0.1 KU/L
JOHNSON GRASS IGE QN: <0.1 KU/L
KENT BLUE GRASS IGE QN: <0.1 KU/L
M RACEMOSUS IGE QN: <0.1 KU/L
MT JUNIPER IGE QN: <0.1 KU/L
MUGWORT IGE QN: <0.1 KU/L
NETTLE IGE QN: <0.1 KU/L
P NOTATUM IGE QN: <0.1 KU/L
S BOTRYOSUM IGE QN: <0.1 KU/L
SHEEP SORREL IGE QN: <0.1 KU/L
SWEET GUM IGE QN: <0.1 KU/L
T011-IGE MAPLE LEAF SYCAMORE: <0.1 KU/L
WHITE ELM IGE QN: <0.1 KU/L
WHITE HICKORY IGE QN: <0.1 KU/L
WHITE MULBERRY IGE QN: <0.1 KU/L
WHITE OAK IGE QN: <0.1 KU/L

## 2019-08-26 LAB — TOTAL IGE SMQN RAST: 39 IU/ML (ref 6–495)

## 2019-08-27 LAB — A FUMIGATUS IGE QN: <0.1 KU/L

## 2019-08-29 DIAGNOSIS — J45.40 MODERATE PERSISTENT ASTHMA WITHOUT COMPLICATION: ICD-10-CM

## 2019-08-29 RX ORDER — BUDESONIDE AND FORMOTEROL FUMARATE DIHYDRATE 160; 4.5 UG/1; UG/1
2 AEROSOL RESPIRATORY (INHALATION) 2 TIMES DAILY
Qty: 2 INHALER | Refills: 3 | COMMUNITY
Start: 2019-08-29 | End: 2019-08-30

## 2019-08-30 ENCOUNTER — TELEPHONE (OUTPATIENT)
Dept: PULMONOLOGY | Facility: CLINIC | Age: 29
End: 2019-08-30

## 2019-08-30 ENCOUNTER — OFFICE VISIT (OUTPATIENT)
Dept: INTERNAL MEDICINE | Facility: CLINIC | Age: 29
End: 2019-08-30

## 2019-08-30 VITALS
HEIGHT: 68 IN | BODY MASS INDEX: 40.32 KG/M2 | DIASTOLIC BLOOD PRESSURE: 84 MMHG | WEIGHT: 266 LBS | OXYGEN SATURATION: 97 % | TEMPERATURE: 98.5 F | SYSTOLIC BLOOD PRESSURE: 130 MMHG | HEART RATE: 103 BPM

## 2019-08-30 DIAGNOSIS — J34.89 ATROPHY OF NASAL TURBINATES: ICD-10-CM

## 2019-08-30 DIAGNOSIS — J45.41 MODERATE PERSISTENT ASTHMA WITH ACUTE EXACERBATION: Primary | ICD-10-CM

## 2019-08-30 DIAGNOSIS — R29.818 SUSPECTED SLEEP APNEA: ICD-10-CM

## 2019-08-30 DIAGNOSIS — R09.82 POSTNASAL DRIP: ICD-10-CM

## 2019-08-30 DIAGNOSIS — J34.2 DEVIATED NASAL SEPTUM: ICD-10-CM

## 2019-08-30 DIAGNOSIS — J45.40 MODERATE PERSISTENT ASTHMA WITHOUT COMPLICATION: Primary | ICD-10-CM

## 2019-08-30 PROCEDURE — 99214 OFFICE O/P EST MOD 30 MIN: CPT | Performed by: PHYSICIAN ASSISTANT

## 2019-08-30 RX ORDER — IPRATROPIUM BROMIDE 42 UG/1
2 SPRAY, METERED NASAL 4 TIMES DAILY PRN
Qty: 1 EACH | Refills: 12 | Status: SHIPPED | OUTPATIENT
Start: 2019-08-30 | End: 2019-10-09

## 2019-08-30 NOTE — PROGRESS NOTES
Delores Sprague is a 28 y.o. female.     Subjective   History of Present Illness   Here today with continued concern of persistent cough and dyspnea on exertion.  9 days ago she established care with a pulmonologist who repeated a pulmonary function test which showed moderate obstruction with significant bronchodilator response and she was subsequently diagnosed with moderate persistent asthma.  The pulmonologist started her on Symbicort which she is taking as directed, although she has not seen any appreciable improvement as of yet.  The pulmonologist also evaluated labs including CBC, MARY, Sjogren's antibody, aspergillus, IgE, ESR, rheumatoid factor and zone 8 allergens which were all unremarkable.  She had some improvement in postnasal drip for around 1 week which abruptly returned last night.  She reports bothersome joint pain, body aches, intermittent blurred vision, left eye twitching, headaches, fatigue, wheezing and occasional chills.  No fever.  At times she coughs so hard she vomits.  For the last month she has been experiencing orthopnea which she describes as feeling pressure on top of her chest if she lays flat on her back.  She is able to lay on either side without any increased breathing difficulty.  She has had previous similar symptoms of orthopnea in the past before she had significant weight loss but has not experienced that in the last year or so since losing the weight.  She notes several years of bilateral lower extremity edema which is always seemed worse in the left leg.  She denies any chest pain with the exception of chest tightness.  She is taking Singulair and zyrtec nightly.  She uses Flonase prn but not consistently.           The following portions of the patient's history were reviewed and updated as appropriate: allergies, current medications, past family history, past medical history, past social history, past surgical history and problem list.    Review of Systems    Constitutional: Positive for chills and fatigue. Negative for appetite change, fever and unexpected weight change.   HENT: Positive for congestion, postnasal drip, rhinorrhea and sinus pressure. Negative for dental problem, drooling, ear pain, facial swelling, mouth sores, nosebleeds, sore throat, trouble swallowing and voice change.    Eyes: Positive for visual disturbance.   Respiratory: Positive for cough, chest tightness, shortness of breath and wheezing. Negative for choking and stridor. Apnea: unsure.         Snores.  Orthopnea.   Cardiovascular: Positive for leg swelling. Negative for chest pain and palpitations.   Gastrointestinal: Positive for vomiting (from cough). Negative for abdominal pain, blood in stool, diarrhea and nausea.   Musculoskeletal: Positive for arthralgias and myalgias.   Allergic/Immunologic: Negative for immunocompromised state.   Neurological: Positive for headaches. Negative for dizziness, tremors, seizures, syncope and numbness.   Hematological: Negative for adenopathy. Does not bruise/bleed easily.   Psychiatric/Behavioral: Positive for sleep disturbance (cough). Negative for agitation, behavioral problems, dysphoric mood, self-injury and suicidal ideas. The patient is not nervous/anxious.          Objective    Physical Exam   Constitutional: She is oriented to person, place, and time. She appears well-developed and well-nourished. No distress.   Obese.   HENT:   Head: Normocephalic and atraumatic.   Mouth/Throat: No oropharyngeal exudate.   Very large right nasal turbinate grossly obstructing the nasal passage. Boggy and erythematous nasal turbinates bilaterally.  Mild clear nasal congestion present.  Mild ethmoid sinus tenderness.  Moderate bilateral TM effusions.  Clear postnasal drip present.  Small oropharynx.   Eyes: Conjunctivae and EOM are normal. Pupils are equal, round, and reactive to light.   Neck: Normal range of motion. Neck supple.   Cardiovascular: Normal rate,  "regular rhythm and normal heart sounds. Exam reveals no gallop and no friction rub.   No murmur heard.  Pulmonary/Chest: Effort normal and breath sounds normal. No stridor. No respiratory distress. She has no wheezes. She has no rales. She exhibits no tenderness.   Abdominal: Soft. Bowel sounds are normal. There is no tenderness.   Musculoskeletal: Normal range of motion. She exhibits no edema, tenderness or deformity.   Lymphadenopathy:     She has no cervical adenopathy.   Neurological: She is alert and oriented to person, place, and time. She displays normal reflexes. No cranial nerve deficit or sensory deficit. She exhibits normal muscle tone. Coordination normal.   Skin: Skin is warm and dry. Capillary refill takes less than 2 seconds. No rash noted. She is not diaphoretic.   Psychiatric: She has a normal mood and affect. Her behavior is normal. Judgment and thought content normal.   Nursing note and vitals reviewed.            /84   Pulse 103   Temp 98.5 °F (36.9 °C)   Ht 172.5 cm (67.91\")   Wt 121 kg (266 lb)   SpO2 97%   BMI 40.55 kg/m²     Nursing note and vitals reviewed.          Assessment/Plan   Delores was seen today for follow-up.    Diagnoses and all orders for this visit:    Moderate persistent asthma with acute exacerbation  Pulmonology office note reviewed as well as labs ordered by pulmonology.  Continue Symbicort twice daily as directed.  Continue albuterol inhaler as needed.    Deviated nasal septum  -     Ambulatory Referral to ENT (Otolaryngology)    Atrophy of nasal turbinates  -     Ambulatory Referral to ENT (Otolaryngology)    Suspected sleep apnea  -     Ambulatory Referral to ENT (Otolaryngology)    Postnasal drip  -     Begin: Ipratropium (ATROVENT) 0.06 % nasal spray; 2 sprays into the nostril(s) as directed by provider 4 (Four) Times a Day As Needed for Rhinitis.      Follow-up with pulmonology as scheduled.  Call or return to clinic if symptoms worsen or persist.       "

## 2019-09-10 ENCOUNTER — TELEPHONE (OUTPATIENT)
Dept: INTERNAL MEDICINE | Facility: CLINIC | Age: 29
End: 2019-09-10

## 2019-09-10 NOTE — TELEPHONE ENCOUNTER
Patient called asking to speak with nurse. She states she would like to discuss her ent appointment.

## 2019-10-09 ENCOUNTER — OFFICE VISIT (OUTPATIENT)
Dept: INTERNAL MEDICINE | Facility: CLINIC | Age: 29
End: 2019-10-09

## 2019-10-09 ENCOUNTER — TELEPHONE (OUTPATIENT)
Dept: INTERNAL MEDICINE | Facility: CLINIC | Age: 29
End: 2019-10-09

## 2019-10-09 VITALS
BODY MASS INDEX: 40.01 KG/M2 | WEIGHT: 264 LBS | HEIGHT: 68 IN | SYSTOLIC BLOOD PRESSURE: 136 MMHG | DIASTOLIC BLOOD PRESSURE: 90 MMHG | HEART RATE: 79 BPM | TEMPERATURE: 98.3 F | OXYGEN SATURATION: 98 %

## 2019-10-09 DIAGNOSIS — K52.9 GASTROENTERITIS: ICD-10-CM

## 2019-10-09 DIAGNOSIS — J45.30 MILD PERSISTENT ASTHMATIC BRONCHITIS WITHOUT COMPLICATION: Primary | ICD-10-CM

## 2019-10-09 PROCEDURE — 99214 OFFICE O/P EST MOD 30 MIN: CPT | Performed by: NURSE PRACTITIONER

## 2019-10-09 RX ORDER — BENZONATATE 100 MG/1
100 CAPSULE ORAL 3 TIMES DAILY PRN
Qty: 21 CAPSULE | Refills: 0 | Status: SHIPPED | OUTPATIENT
Start: 2019-10-09 | End: 2019-10-16

## 2019-10-09 RX ORDER — AMOXICILLIN AND CLAVULANATE POTASSIUM 875; 125 MG/1; MG/1
1 TABLET, FILM COATED ORAL 2 TIMES DAILY
Qty: 20 TABLET | Refills: 0 | Status: SHIPPED | OUTPATIENT
Start: 2019-10-09 | End: 2019-10-19

## 2019-10-09 RX ORDER — DEXTROMETHORPHAN HYDROBROMIDE AND PROMETHAZINE HYDROCHLORIDE 15; 6.25 MG/5ML; MG/5ML
5 SYRUP ORAL 4 TIMES DAILY PRN
Qty: 118 ML | Refills: 0 | OUTPATIENT
Start: 2019-10-09 | End: 2019-12-31

## 2019-10-09 NOTE — TELEPHONE ENCOUNTER
Patient called requesting a doctors note regarding her last visit, states her employer is needing this information. Patient also is wanting to speak to nurse, but did not specify why. Please contact patient.

## 2019-10-09 NOTE — PROGRESS NOTES
"Date: 10/09/2019    Name: Delores Sprague  : 1990    Chief Complaint:   Chief Complaint   Patient presents with   • Diarrhea     vomitting       HPI: Patient presents today with 3 h/o diarrhea, vomiting.  Both her young sons who live with her have had GI bugs in the past few days.  She does not recall eating anything suspect.  Denies fever, chills, abdominal cramps.  States n/v are improving.    She has a h/o asthma and her cough, wheezing has worsened, necessitating an increase in use of her rescue inhaler (2 x a day).  Cough is nonproductive, and she feels SOA more often.  Cough is keeping her awake. She reports increasing fatigue.  Denies palpitations, chest pain, nasal congestion.  Has not taken anything other than increasing use of inhaler for cough.       History:  The following portions of the patient's history were reviewed and updated as appropriate: allergies, current medications, past medical history, family history, surgical history, social history and problem list.     ROS:  Review of Systems   Constitutional: Positive for appetite change.   HENT: Positive for sore throat. Negative for congestion, ear pain, postnasal drip, sneezing and tinnitus.    Cardiovascular: Negative for chest pain and leg swelling.   Gastrointestinal: Negative for constipation.   Genitourinary: Negative for dysuria, frequency and urgency.   Skin: Negative for pallor and rash.       VS:  Vitals:    10/09/19 1325   BP: 136/90   Pulse: 79   Temp: 98.3 °F (36.8 °C)   TempSrc: Temporal   SpO2: 98%   Weight: 120 kg (264 lb)   Height: 172.6 cm (67.94\")     Body mass index is 40.21 kg/m².    PE:  Physical Exam   Constitutional: She is oriented to person, place, and time. She appears well-developed and well-nourished. No distress.   HENT:   Head: Normocephalic.   Right Ear: Tympanic membrane, external ear and ear canal normal.   Left Ear: Tympanic membrane, external ear and ear canal normal.   Nose: Nose normal. "   Mouth/Throat: Uvula is midline and mucous membranes are normal. Posterior oropharyngeal erythema present.   Eyes: Conjunctivae are normal. Pupils are equal, round, and reactive to light.   Neck: Normal range of motion. Neck supple.   Cardiovascular: Normal rate, regular rhythm, normal heart sounds and intact distal pulses.   Pulmonary/Chest: Effort normal. She has wheezes. She has rhonchi. She has no rales.   Abdominal: Soft. Bowel sounds are normal. There is no tenderness. There is no rigidity, no guarding, no CVA tenderness and negative Mays's sign.   Lymphadenopathy:     She has no cervical adenopathy.   Neurological: She is alert and oriented to person, place, and time.   Skin: Skin is warm. Capillary refill takes less than 2 seconds.     Assessment/Plan:  Delores was seen today for diarrhea.    Diagnoses and all orders for this visit:    Mild persistent asthmatic bronchitis without complication  -     promethazine-dextromethorphan (PROMETHAZINE-DM) 6.25-15 MG/5ML syrup; Take 5 mL by mouth 4 (Four) Times a Day As Needed for Cough. Advised may cause drowsiness; not to be taken with cold/cough/sinus remedies, alcohol or sedatives.  -     amoxicillin-clavulanate (AUGMENTIN) 875-125 MG per tablet; Take 1 tablet by mouth 2 (Two) Times a Day for 10 days.  -     benzonatate (TESSALON PERLES) 100 MG capsule; Take 1 capsule by mouth 3 (Three) Times a Day As Needed for Cough for up to 7 days.  - Monitor for worsening symptoms, fever, chills - call or return to clinic should they occur  Gastroenteritis        - [place] foods, avoid greasy/spicy foods or dairy until symptoms improve.  Then, advance diet as tolerated        - Drink plenty of clear, decaffeinated fluids, as tolerated.    Return in about 2 months (around 12/9/2019) for Annual.

## 2019-11-25 ENCOUNTER — TELEPHONE (OUTPATIENT)
Dept: PULMONOLOGY | Facility: CLINIC | Age: 29
End: 2019-11-25

## 2019-11-25 NOTE — TELEPHONE ENCOUNTER
CALLED PT TO RESCHEDULE APPT THAT SHE RESCHEDULED VIA [employer]. SHE STATED SHE IS OUT OF TOWN DUE TO A FAMILY EMERGENCY AND WILL CALL BACK TO RESCHEDULE.

## 2019-12-19 ENCOUNTER — TELEPHONE (OUTPATIENT)
Dept: INTERNAL MEDICINE | Facility: CLINIC | Age: 29
End: 2019-12-19

## 2019-12-19 NOTE — TELEPHONE ENCOUNTER
Pt wants referral for therapist, pt wants Jose Lopez, she will schedule, we just need to let her know when we put in referral, and then I will fax

## 2019-12-20 DIAGNOSIS — F41.1 GENERALIZED ANXIETY DISORDER: Primary | ICD-10-CM

## 2020-01-02 ENCOUNTER — HOSPITAL ENCOUNTER (EMERGENCY)
Facility: HOSPITAL | Age: 30
Discharge: HOME | End: 2020-01-02
Attending: STUDENT IN AN ORGANIZED HEALTH CARE EDUCATION/TRAINING PROGRAM | Admitting: STUDENT IN AN ORGANIZED HEALTH CARE EDUCATION/TRAINING PROGRAM

## 2020-01-02 VITALS
TEMPERATURE: 98.1 F | HEIGHT: 68 IN | RESPIRATION RATE: 20 BRPM | WEIGHT: 280 LBS | SYSTOLIC BLOOD PRESSURE: 125 MMHG | DIASTOLIC BLOOD PRESSURE: 88 MMHG | OXYGEN SATURATION: 97 % | HEART RATE: 58 BPM | BODY MASS INDEX: 42.44 KG/M2

## 2020-01-02 DIAGNOSIS — R11.2 NAUSEA AND VOMITING, INTRACTABILITY OF VOMITING NOT SPECIFIED, UNSPECIFIED VOMITING TYPE: Primary | ICD-10-CM

## 2020-01-02 LAB
ALBUMIN SERPL-MCNC: 4 G/DL (ref 3.5–5.2)
ALBUMIN/GLOB SERPL: 1.4 G/DL
ALP SERPL-CCNC: 76 U/L (ref 39–117)
ALT SERPL W P-5'-P-CCNC: 25 U/L (ref 1–33)
ANION GAP SERPL CALCULATED.3IONS-SCNC: 12.2 MMOL/L (ref 5–15)
AST SERPL-CCNC: 18 U/L (ref 1–32)
BASOPHILS # BLD AUTO: 0.06 10*3/MM3 (ref 0–0.2)
BASOPHILS NFR BLD AUTO: 0.5 % (ref 0–1.5)
BILIRUB SERPL-MCNC: 0.2 MG/DL (ref 0.2–1.2)
BILIRUB UR QL STRIP: NEGATIVE
BUN BLD-MCNC: 10 MG/DL (ref 6–20)
BUN/CREAT SERPL: 13.2 (ref 7–25)
CALCIUM SPEC-SCNC: 8.9 MG/DL (ref 8.6–10.5)
CHLORIDE SERPL-SCNC: 102 MMOL/L (ref 98–107)
CLARITY UR: CLEAR
CO2 SERPL-SCNC: 25.8 MMOL/L (ref 22–29)
COLOR UR: YELLOW
CREAT BLD-MCNC: 0.76 MG/DL (ref 0.57–1)
DEPRECATED RDW RBC AUTO: 39.8 FL (ref 37–54)
EOSINOPHIL # BLD AUTO: 0.5 10*3/MM3 (ref 0–0.4)
EOSINOPHIL NFR BLD AUTO: 4.3 % (ref 0.3–6.2)
ERYTHROCYTE [DISTWIDTH] IN BLOOD BY AUTOMATED COUNT: 12.9 % (ref 12.3–15.4)
GFR SERPL CREATININE-BSD FRML MDRD: 89 ML/MIN/1.73
GLOBULIN UR ELPH-MCNC: 2.9 GM/DL
GLUCOSE BLD-MCNC: 99 MG/DL (ref 65–99)
GLUCOSE UR STRIP-MCNC: NEGATIVE MG/DL
HCT VFR BLD AUTO: 46.2 % (ref 34–46.6)
HGB BLD-MCNC: 15.2 G/DL (ref 12–15.9)
HGB UR QL STRIP.AUTO: NEGATIVE
HOLD SPECIMEN: NORMAL
HOLD SPECIMEN: NORMAL
IMM GRANULOCYTES # BLD AUTO: 0.04 10*3/MM3 (ref 0–0.05)
IMM GRANULOCYTES NFR BLD AUTO: 0.3 % (ref 0–0.5)
KETONES UR QL STRIP: NEGATIVE
LEUKOCYTE ESTERASE UR QL STRIP.AUTO: NEGATIVE
LIPASE SERPL-CCNC: 40 U/L (ref 13–60)
LYMPHOCYTES # BLD AUTO: 4.26 10*3/MM3 (ref 0.7–3.1)
LYMPHOCYTES NFR BLD AUTO: 36.6 % (ref 19.6–45.3)
MCH RBC QN AUTO: 28.3 PG (ref 26.6–33)
MCHC RBC AUTO-ENTMCNC: 32.9 G/DL (ref 31.5–35.7)
MCV RBC AUTO: 85.9 FL (ref 79–97)
MONOCYTES # BLD AUTO: 0.76 10*3/MM3 (ref 0.1–0.9)
MONOCYTES NFR BLD AUTO: 6.5 % (ref 5–12)
NEUTROPHILS # BLD AUTO: 6.03 10*3/MM3 (ref 1.7–7)
NEUTROPHILS NFR BLD AUTO: 51.8 % (ref 42.7–76)
NITRITE UR QL STRIP: NEGATIVE
NRBC BLD AUTO-RTO: 0 /100 WBC (ref 0–0.2)
PH UR STRIP.AUTO: 6.5 [PH] (ref 5–8)
PLATELET # BLD AUTO: 323 10*3/MM3 (ref 140–450)
PMV BLD AUTO: 9.5 FL (ref 6–12)
POTASSIUM BLD-SCNC: 4 MMOL/L (ref 3.5–5.2)
PROT SERPL-MCNC: 6.9 G/DL (ref 6–8.5)
PROT UR QL STRIP: NEGATIVE
RBC # BLD AUTO: 5.38 10*6/MM3 (ref 3.77–5.28)
SODIUM BLD-SCNC: 140 MMOL/L (ref 136–145)
SP GR UR STRIP: <=1.005 (ref 1–1.03)
UROBILINOGEN UR QL STRIP: NORMAL
WBC NRBC COR # BLD: 11.65 10*3/MM3 (ref 3.4–10.8)
WHOLE BLOOD HOLD SPECIMEN: NORMAL
WHOLE BLOOD HOLD SPECIMEN: NORMAL

## 2020-01-02 PROCEDURE — 96374 THER/PROPH/DIAG INJ IV PUSH: CPT

## 2020-01-02 PROCEDURE — 80053 COMPREHEN METABOLIC PANEL: CPT | Performed by: STUDENT IN AN ORGANIZED HEALTH CARE EDUCATION/TRAINING PROGRAM

## 2020-01-02 PROCEDURE — 85025 COMPLETE CBC W/AUTO DIFF WBC: CPT | Performed by: STUDENT IN AN ORGANIZED HEALTH CARE EDUCATION/TRAINING PROGRAM

## 2020-01-02 PROCEDURE — 25010000002 PROMETHAZINE PER 50 MG: Performed by: STUDENT IN AN ORGANIZED HEALTH CARE EDUCATION/TRAINING PROGRAM

## 2020-01-02 PROCEDURE — 25010000002 ONDANSETRON PER 1 MG: Performed by: STUDENT IN AN ORGANIZED HEALTH CARE EDUCATION/TRAINING PROGRAM

## 2020-01-02 PROCEDURE — 96375 TX/PRO/DX INJ NEW DRUG ADDON: CPT

## 2020-01-02 PROCEDURE — 99283 EMERGENCY DEPT VISIT LOW MDM: CPT

## 2020-01-02 PROCEDURE — 83690 ASSAY OF LIPASE: CPT | Performed by: STUDENT IN AN ORGANIZED HEALTH CARE EDUCATION/TRAINING PROGRAM

## 2020-01-02 PROCEDURE — 81003 URINALYSIS AUTO W/O SCOPE: CPT | Performed by: STUDENT IN AN ORGANIZED HEALTH CARE EDUCATION/TRAINING PROGRAM

## 2020-01-02 RX ORDER — ONDANSETRON 2 MG/ML
4 INJECTION INTRAMUSCULAR; INTRAVENOUS ONCE
Status: COMPLETED | OUTPATIENT
Start: 2020-01-02 | End: 2020-01-02

## 2020-01-02 RX ORDER — PROMETHAZINE HYDROCHLORIDE 25 MG/1
25 TABLET ORAL EVERY 6 HOURS PRN
Qty: 15 TABLET | Refills: 0 | Status: SHIPPED | OUTPATIENT
Start: 2020-01-02 | End: 2021-01-07

## 2020-01-02 RX ORDER — SODIUM CHLORIDE 0.9 % (FLUSH) 0.9 %
10 SYRINGE (ML) INJECTION AS NEEDED
Status: DISCONTINUED | OUTPATIENT
Start: 2020-01-02 | End: 2020-01-03 | Stop reason: HOSPADM

## 2020-01-02 RX ORDER — PROMETHAZINE HYDROCHLORIDE 25 MG/ML
12.5 INJECTION, SOLUTION INTRAMUSCULAR; INTRAVENOUS ONCE
Status: COMPLETED | OUTPATIENT
Start: 2020-01-02 | End: 2020-01-02

## 2020-01-02 RX ADMIN — SODIUM CHLORIDE 1000 ML: 9 INJECTION, SOLUTION INTRAVENOUS at 23:05

## 2020-01-02 RX ADMIN — ONDANSETRON 4 MG: 2 INJECTION INTRAMUSCULAR; INTRAVENOUS at 22:26

## 2020-01-02 RX ADMIN — PROMETHAZINE HYDROCHLORIDE 12.5 MG: 25 INJECTION INTRAMUSCULAR; INTRAVENOUS at 22:26

## 2020-01-28 ENCOUNTER — TELEPHONE (OUTPATIENT)
Dept: INTERNAL MEDICINE | Facility: CLINIC | Age: 30
End: 2020-01-28

## 2020-01-28 NOTE — TELEPHONE ENCOUNTER
Pt called reqarding a letter stating isnt taking anymore lupus treatments, its for spouse [employer] needing the statement . Please advise when ready . Patient stated will .     Call back [phone]

## 2020-02-04 ENCOUNTER — OFFICE VISIT (OUTPATIENT)
Dept: INTERNAL MEDICINE | Facility: CLINIC | Age: 30
End: 2020-02-04

## 2020-02-04 VITALS
OXYGEN SATURATION: 99 % | DIASTOLIC BLOOD PRESSURE: 72 MMHG | HEIGHT: 68 IN | BODY MASS INDEX: 41.52 KG/M2 | HEART RATE: 103 BPM | SYSTOLIC BLOOD PRESSURE: 110 MMHG | WEIGHT: 274 LBS

## 2020-02-04 DIAGNOSIS — J10.1 INFLUENZA A: Primary | ICD-10-CM

## 2020-02-04 PROCEDURE — 99213 OFFICE O/P EST LOW 20 MIN: CPT | Performed by: PHYSICIAN ASSISTANT

## 2020-02-04 PROCEDURE — 87804 INFLUENZA ASSAY W/OPTIC: CPT | Performed by: PHYSICIAN ASSISTANT

## 2020-02-06 LAB
EXPIRATION DATE: ABNORMAL
FLUAV AG NPH QL: POSITIVE
FLUBV AG NPH QL: NEGATIVE
INTERNAL CONTROL: ABNORMAL
Lab: ABNORMAL

## 2020-02-06 NOTE — PROGRESS NOTES
Delores Sprague is a 29 y.o. female.     Subjective   History of Present Illness   Here today accompanied by her  and 2 small children with concern of 3 to 4 days of fever, cough, little SOA, headache, little sore throat, sweats, chills, body aches and a little diarrhea.  She has been taking Tylenol as needed which helps with fever and body aches some.  No wheezing or chest tightness.  She was the first to get sick followed by both of her children the next day and her  the following day.  She did not receive an influenza vaccine this season.      The following portions of the patient's history were reviewed and updated as appropriate: allergies, current medications, past family history, past medical history, past social history, past surgical history and problem list.    Review of Systems   Constitutional: Positive for chills, fatigue and fever. Negative for appetite change and unexpected weight change.   HENT: Positive for congestion, postnasal drip, rhinorrhea and sore throat. Negative for drooling, ear pain, hearing loss, sinus pressure and sinus pain.    Respiratory: Positive for cough and shortness of breath. Negative for chest tightness and wheezing.    Cardiovascular: Negative for chest pain, palpitations and leg swelling.   Gastrointestinal: Positive for diarrhea. Negative for abdominal pain, nausea and vomiting.   Musculoskeletal: Positive for arthralgias. Negative for neck pain and neck stiffness.   Skin: Negative for rash.   Allergic/Immunologic: Negative for immunocompromised state.   Neurological: Positive for headaches. Negative for weakness.   Hematological: Negative for adenopathy. Does not bruise/bleed easily.   Psychiatric/Behavioral: Negative for behavioral problems, dysphoric mood, self-injury and suicidal ideas. The patient is not nervous/anxious.          Objective    Physical Exam   Constitutional: She is oriented to person, place, and time. She appears well-developed and  "well-nourished. No distress.   HENT:   Head: Normocephalic and atraumatic.   Right Ear: External ear normal.   Left Ear: External ear normal.   Mouth/Throat: No oropharyngeal exudate.   Clear postnasal drip and clear nasal discharge.   Eyes: Pupils are equal, round, and reactive to light. Conjunctivae and EOM are normal.   Neck: Normal range of motion. Neck supple. No thyromegaly present.   Cardiovascular: Normal rate, regular rhythm and normal heart sounds. Exam reveals no gallop and no friction rub.   No murmur heard.  Pulmonary/Chest: Effort normal and breath sounds normal. No respiratory distress. She has no wheezes. She has no rales. She exhibits no tenderness.   Abdominal: Soft. Bowel sounds are normal. She exhibits no distension. There is no tenderness. There is no guarding.   Musculoskeletal: Normal range of motion. She exhibits no tenderness.   Lymphadenopathy:     She has no cervical adenopathy.   Neurological: She is alert and oriented to person, place, and time. No cranial nerve deficit or sensory deficit.   Skin: Skin is warm and dry. Capillary refill takes less than 2 seconds. No rash noted. She is not diaphoretic.   Psychiatric: She has a normal mood and affect. Her behavior is normal. Judgment and thought content normal.   Nursing note and vitals reviewed.        /72   Pulse 103   Ht 172.7 cm (67.99\")   Wt 124 kg (274 lb)   SpO2 99%   BMI 41.67 kg/m²     Nursing note and vitals reviewed.          Assessment/Plan   Delores was seen today for cough and fatigue.    Diagnoses and all orders for this visit:    Influenza A  -     POC Influenza A / B - positive A, negative B    Sip warm fluids to ease cough and sore throat.  Increase p.o. fluid intake overall.  Continue Tylenol and/or ibuprofen as needed for symptom control.      Call or return to clinic if symptoms worsen or persist.       "

## 2020-04-20 DIAGNOSIS — G43.109 MIGRAINE WITH AURA AND WITHOUT STATUS MIGRAINOSUS, NOT INTRACTABLE: ICD-10-CM

## 2020-04-20 RX ORDER — ELETRIPTAN HYDROBROMIDE 40 MG/1
40 TABLET, FILM COATED ORAL ONCE AS NEEDED
Qty: 6 TABLET | Refills: 5 | Status: SHIPPED | OUTPATIENT
Start: 2020-04-20 | End: 2021-01-07

## 2020-06-09 ENCOUNTER — OFFICE VISIT (OUTPATIENT)
Dept: INTERNAL MEDICINE | Facility: CLINIC | Age: 30
End: 2020-06-09

## 2020-06-09 VITALS
SYSTOLIC BLOOD PRESSURE: 116 MMHG | DIASTOLIC BLOOD PRESSURE: 70 MMHG | OXYGEN SATURATION: 99 % | WEIGHT: 293 LBS | HEART RATE: 81 BPM | HEIGHT: 68 IN | BODY MASS INDEX: 44.41 KG/M2 | TEMPERATURE: 97.1 F

## 2020-06-09 DIAGNOSIS — Z01.00 ENCOUNTER FOR VISION SCREENING: ICD-10-CM

## 2020-06-09 DIAGNOSIS — Z00.00 ANNUAL PHYSICAL EXAM: Primary | ICD-10-CM

## 2020-06-09 DIAGNOSIS — F41.1 GENERALIZED ANXIETY DISORDER: ICD-10-CM

## 2020-06-09 DIAGNOSIS — Z91.89 NEED FOR DENTAL CARE: ICD-10-CM

## 2020-06-09 DIAGNOSIS — E66.01 CLASS 3 SEVERE OBESITY DUE TO EXCESS CALORIES WITHOUT SERIOUS COMORBIDITY WITH BODY MASS INDEX (BMI) OF 45.0 TO 49.9 IN ADULT (HCC): ICD-10-CM

## 2020-06-09 PROCEDURE — 99395 PREV VISIT EST AGE 18-39: CPT | Performed by: PHYSICIAN ASSISTANT

## 2020-06-09 NOTE — PROGRESS NOTES
Subjective   Delores Sprague is a 29 y.o. female and is here for a comprehensive physical exam. The patient reports no problems.    HPI: Doing well with no new concerns. Anxiety is well controlled with Zoloft and Buspar.  No SI or HI.       Health Habits:  Eye exam within last 2 years? Due now  Dental exam every 6 months? No. [facility] for dental care.   Exercise habits: few days per week  Healthy diet? attempting    The ASCVD Risk score (Ciera ЕЛЕНА Jr., et al., 2013) failed to calculate for the following reasons:    The 2013 ASCVD risk score is only valid for ages 40 to 79        Do you take any herbs or supplements that were not prescribed by a doctor? no  Are you taking calcium supplements? No  Are you taking aspirin daily? No     History:  LMP: No LMP recorded. (Menstrual status: Tubal ligation).  Menopause: No  Last pap date: 2017  Abnormal pap? no  Family history of breast or ovarian cancer: yes         OB History   No data available     Sexual activity questions deferred to the physician.        The following portions of the patient's history were reviewed and updated as appropriate: She  has a past medical history of Arthritis, Asthma, Depression, Diverticulosis, GERD (gastroesophageal reflux disease), Lupus (CMS/HCC), and Seasonal allergies.  She does not have any pertinent problems on file.  She  has a past surgical history that includes  section; Salpingectomy (Bilateral); Laparoscopic cholecystectomy (2019); and Gallbladder surgery.  Her family history includes Breast cancer in her mother and another family member; COPD in her maternal grandfather; Diabetes in her father and mother; Hyperlipidemia in her mother; Hypertension in her mother; Liver cancer in an other family member; Lung cancer in her maternal grandmother and another family member; Mental illness in her father; Pancreatic cancer in her paternal grandfather; Uterine cancer in an other family member.  She  reports that she  has never smoked. She has never used smokeless tobacco. She reports that she does not drink alcohol or use drugs.  Current Outpatient Medications   Medication Sig Dispense Refill   • busPIRone (BUSPAR) 5 MG tablet Take 1-2 tablets by mouth every 8 hours as needed. 180 tablet 5   • cetirizine (zyrTEC) 10 MG tablet Take 1 tablet by mouth Daily. 30 tablet 0   • eletriptan (Relpax) 40 MG tablet Take 1 tablet by mouth 1 (One) Time As Needed for Migraine for up to 1 dose. may repeat in 2 hours if necessary 6 tablet 5   • fluticasone-salmeterol (ADVAIR) 250-50 MCG/DOSE DISKUS Inhale 1 puff 2 (Two) Times a Day. 60 each 5   • montelukast (SINGULAIR) 10 MG tablet Take 1 tablet by mouth Every Night. 30 tablet 5   • ondansetron (ZOFRAN) 4 MG tablet Take 1 tablet by mouth Every 6 (Six) Hours As Needed for Nausea. 16 tablet 0   • promethazine (PHENERGAN) 25 MG tablet Take 1 tablet by mouth Every 6 (Six) Hours As Needed for Nausea or Vomiting. 15 tablet 0   • sertraline (ZOLOFT) 50 MG tablet Take 1.5 tablets by mouth Daily. 135 tablet 3     No current facility-administered medications for this visit.        Review of Systems  Do you have pain that bothers you in your daily life? no    Review of Systems   Constitutional: Negative for activity change, appetite change, chills, diaphoresis, fatigue, fever, unexpected weight gain and unexpected weight loss.   HENT: Negative for congestion, dental problem, ear pain, mouth sores, postnasal drip, rhinorrhea, sinus pressure, sneezing, sore throat, swollen glands, trouble swallowing and voice change.    Eyes: Positive for visual disturbance (wears glasses). Negative for blurred vision, double vision, pain, discharge, redness and itching.   Respiratory: Negative for cough, choking, chest tightness, shortness of breath and wheezing.    Cardiovascular: Negative for chest pain, palpitations and leg swelling.   Gastrointestinal: Negative for abdominal distention, abdominal pain, blood in stool,  "constipation, diarrhea, nausea, rectal pain, vomiting, GERD and indigestion.   Endocrine: Negative for cold intolerance, heat intolerance, polydipsia, polyphagia and polyuria.   Genitourinary: Negative for breast discharge, breast lump, breast pain, decreased libido, decreased urine volume, difficulty urinating, dysuria, flank pain, frequency, hematuria, pelvic pain, urgency, vaginal bleeding and vaginal pain.   Musculoskeletal: Negative for arthralgias, back pain, gait problem, myalgias and neck pain.   Skin: Negative for color change, rash and skin lesions.   Allergic/Immunologic: Positive for environmental allergies. Negative for immunocompromised state.   Neurological: Positive for headache (occasional). Negative for dizziness, tremors, facial asymmetry, speech difficulty, weakness, light-headedness, numbness, memory problem and confusion.   Hematological: Negative for adenopathy. Does not bruise/bleed easily.   Psychiatric/Behavioral: Negative for agitation, behavioral problems, decreased concentration, hallucinations, self-injury, sleep disturbance, suicidal ideas, negative for hyperactivity, depressed mood and stress. The patient is nervous/anxious (stable).          Objective   /70   Pulse 81   Temp 97.1 °F (36.2 °C)   Ht 172.7 cm (67.99\")   Wt 136 kg (299 lb)   SpO2 99%   BMI 45.47 kg/m²     Physical Exam   Constitutional: She is oriented to person, place, and time. She appears well-developed and well-nourished. No distress.   Obese.   HENT:   Head: Normocephalic and atraumatic.   Eyes: Pupils are equal, round, and reactive to light. Conjunctivae and EOM are normal. No scleral icterus.   Neck: Normal range of motion. Neck supple. No thyromegaly present.   Cardiovascular: Normal rate, regular rhythm and normal heart sounds. Exam reveals no gallop and no friction rub.   No murmur heard.  Pulmonary/Chest: Effort normal and breath sounds normal. No respiratory distress. She has no wheezes. She has " no rales. She exhibits no tenderness.   Abdominal: Soft. Bowel sounds are normal. She exhibits no distension and no mass. There is no tenderness. There is no rebound.   Musculoskeletal: Normal range of motion. She exhibits no edema, tenderness or deformity.   Lymphadenopathy:     She has no cervical adenopathy.   Neurological: She is alert and oriented to person, place, and time. She displays normal reflexes. No cranial nerve deficit or sensory deficit.   Skin: Skin is warm and dry. Capillary refill takes less than 2 seconds. No rash noted. She is not diaphoretic. No pallor.   Psychiatric: She has a normal mood and affect. Her behavior is normal. Judgment and thought content normal.   Nursing note and vitals reviewed.         Assessment/Plan   Healthy female exam.     1.    Diagnosis Plan   1. Annual physical exam     2. Class 3 severe obesity due to excess calories without serious comorbidity with body mass index (BMI) of 45.0 to 49.9 in adult (CMS/[facility])  Patient's Body mass index is 45.47 kg/m². BMI is above normal parameters. Recommendations include: educational material, exercise counseling and nutrition counseling.     3. Encounter for vision screening  Ambulatory Referral to Optometry     4. Generalized anxiety disorder  Stable, continue Zoloft and Buspar.      5.  Need for dental care Ambulatory referral to dentistry         2. Patient Counseling:  --Nutrition: Stressed importance of moderation in sodium/caffeine intake, saturated fat and cholesterol, caloric balance, sufficient intake of fresh fruits, vegetables, fiber, calcium, iron, and 1 g folate supplementation if of childbearing age.   --Discussed the issue of calcium supplement, and the daily use of baby aspirin if applicable.             --Mammogram recommended every 2 years from age 40-49 and yearly beginning at age 50.  --Exercise: Stressed the importance of regular exercise.   --Substance Abuse: Discussed cessation/primary prevention of tobacco (if  applicable), alcohol, or other drug use (if applicable); driving or other dangerous activities under the influence; availability of treatment for abuse.    --Sexuality: Discussed sexually transmitted diseases, partner selection, use of condoms, avoidance of unintended pregnancy  and contraceptive alternatives.   --Injury prevention: Discussed safety belts, safety helmets, smoke detector, smoking near bedding or upholstery.   --Dental health: Discussed importance of regular tooth brushing, flossing, and dental visits every 6 months.  --Immunizations reviewed.  --Discussed benefits of screening colonoscopy (if applicable).  --After hours service discussed with patient    3. Discussed the patient's BMI with her.  The BMI is above average; BMI management plan is completed  4. Return in about 3 months (around 9/9/2020) for Next scheduled follow up, PAPNATHALIA Delacruz  06/09/2020  3:11 PM

## 2020-06-09 NOTE — PATIENT INSTRUCTIONS
Exercising to Lose Weight  Exercise is structured, repetitive physical activity to improve fitness and health. Getting regular exercise is important for everyone. It is especially important if you are overweight. Being overweight increases your risk of heart disease, stroke, diabetes, high blood pressure, and several types of cancer. Reducing your calorie intake and exercising can help you lose weight.  Exercise is usually categorized as moderate or vigorous intensity. To lose weight, most people need to do a certain amount of moderate-intensity or vigorous-intensity exercise each week.  Moderate-intensity exercise    Moderate-intensity exercise is any activity that gets you moving enough to burn at least three times more energy (calories) than if you were sitting.  Examples of moderate exercise include:  · Walking a mile in 15 minutes.  · Doing light yard work.  · Biking at an easy pace.  Most people should get at least 150 minutes (2 hours and 30 minutes) a week of moderate-intensity exercise to maintain their body weight.  Vigorous-intensity exercise  Vigorous-intensity exercise is any activity that gets you moving enough to burn at least six times more calories than if you were sitting. When you exercise at this intensity, you should be working hard enough that you are not able to carry on a conversation.  Examples of vigorous exercise include:  · Running.  · Playing a team sport, such as football, basketball, and soccer.  · Jumping rope.  Most people should get at least 75 minutes (1 hour and 15 minutes) a week of vigorous-intensity exercise to maintain their body weight.  How can exercise affect me?  When you exercise enough to burn more calories than you eat, you lose weight. Exercise also reduces body fat and builds muscle. The more muscle you have, the more calories you burn. Exercise also:  · Improves mood.  · Reduces stress and tension.  · Improves your overall fitness, flexibility, and  endurance.  · Increases bone strength.  The amount of exercise you need to lose weight depends on:  · Your age.  · The type of exercise.  · Any health conditions you have.  · Your overall physical ability.  Talk to your health care provider about how much exercise you need and what types of activities are safe for you.  What actions can I take to lose weight?  Nutrition    · Make changes to your diet as told by your health care provider or diet and nutrition specialist (dietitian). This may include:  ? Eating fewer calories.  ? Eating more protein.  ? Eating less unhealthy fats.  ? Eating a diet that includes fresh fruits and vegetables, whole grains, low-fat dairy products, and lean protein.  ? Avoiding foods with added fat, salt, and sugar.  · Drink plenty of water while you exercise to prevent dehydration or heat stroke.  Activity  · Choose an activity that you enjoy and set realistic goals. Your health care provider can help you make an exercise plan that works for you.  · Exercise at a moderate or vigorous intensity most days of the week.  ? The intensity of exercise may vary from person to person. You can tell how intense a workout is for you by paying attention to your breathing and heartbeat. Most people will notice their breathing and heartbeat get faster with more intense exercise.  · Do resistance training twice each week, such as:  ? Push-ups.  ? Sit-ups.  ? Lifting weights.  ? Using resistance bands.  · Getting short amounts of exercise can be just as helpful as long structured periods of exercise. If you have trouble finding time to exercise, try to include exercise in your daily routine.  ? Get up, stretch, and walk around every 30 minutes throughout the day.  ? Go for a walk during your lunch break.  ? Park your car farther away from your destination.  ? If you take public transportation, get off one stop early and walk the rest of the way.  ? Make phone calls while standing up and walking  around.  ? Take the stairs instead of elevators or escalators.  · Wear comfortable clothes and shoes with good support.  · Do not exercise so much that you hurt yourself, feel dizzy, or get very short of breath.  Where to find more information  · [place] Department of Health and Human Services: www.hhs.gov  · Centers for Disease Control and Prevention (CDC): www.cdc.gov  Contact a health care provider:  · Before starting a new exercise program.  · If you have questions or concerns about your weight.  · If you have a medical problem that keeps you from exercising.  Get help right away if you have any of the following while exercising:  · Injury.  · Dizziness.  · Difficulty breathing or shortness of breath that does not go away when you stop exercising.  · Chest pain.  · Rapid heartbeat.  Summary  · Being overweight increases your risk of heart disease, stroke, diabetes, high blood pressure, and several types of cancer.  · Losing weight happens when you burn more calories than you eat.  · Reducing the amount of calories you eat in addition to getting regular moderate or vigorous exercise each week helps you lose weight.  This information is not intended to replace advice given to you by your health care provider. Make sure you discuss any questions you have with your health care provider.  Document Released: 01/20/2012 Document Revised: 12/31/2018 Document Reviewed: 12/31/2018  [employer] Patient Education © 2020 [employer]    Obesity, Adult  Obesity is the condition of having too much total body fat. Being overweight or obese means that your weight is greater than what is considered healthy for your body size. Obesity is determined by a measurement called BMI. BMI is an estimate of body fat and is calculated from height and weight. For adults, a BMI of 30 or higher is considered obese.  Obesity can lead to other health concerns and major illnesses, including:  · Stroke.  · Coronary artery disease (CAD).  · Type 2  diabetes.  · Some types of cancer, including cancers of the colon, breast, uterus, and gallbladder.  · Osteoarthritis.  · High blood pressure (hypertension).  · High cholesterol.  · Sleep apnea.  · Gallbladder stones.  · Infertility problems.  What are the causes?  Common causes of this condition include:  · Eating daily meals that are high in calories, sugar, and fat.  · Being born with genes that may make you more likely to become obese.  · Having a medical condition that causes obesity, including:  ? Hypothyroidism.  ? Polycystic ovarian syndrome (PCOS).  ? Binge-eating disorder.  ? Cushing syndrome.  · Taking certain medicines, such as steroids, antidepressants, and seizure medicines.  · Not being physically active (sedentary lifestyle).  · Not getting enough sleep.  · Drinking high amounts of sugar-sweetened beverages, such as soft drinks.  What increases the risk?  The following factors may make you more likely to develop this condition:  · Having a family history of obesity.  · Being a woman of  descent.  · Being a man of  descent.  · Living in an area with limited access to:  ? [place], recreation centers, or sidewalks.  ? Healthy food choices, such as grocery stores and [employer] markets.  What are the signs or symptoms?  The main sign of this condition is having too much body fat.  How is this diagnosed?  This condition is diagnosed based on:  · Your BMI. If you are an adult with a BMI of 30 or higher, you are considered obese.  · Your waist circumference. This measures the distance around your waistline.  · Your skinfold thickness. Your health care provider may gently pinch a fold of your skin and measure it.  You may have other tests to check for underlying conditions.  How is this treated?  Treatment for this condition often includes changing your lifestyle. Treatment may include some or all of the following:  · Dietary changes. This may include developing a healthy meal  plan.  · Regular physical activity. This may include activity that causes your heart to beat faster (aerobic exercise) and strength training. Work with your health care provider to design an exercise program that works for you.  · Medicine to help you lose weight if you are unable to lose 1 pound a week after 6 weeks of healthy eating and more physical activity.  · Treating conditions that cause the obesity (underlying conditions).  · Surgery. Surgical options may include gastric banding and gastric bypass. Surgery may be done if:  ? Other treatments have not helped to improve your condition.  ? You have a BMI of 40 or higher.  ? You have life-threatening health problems related to obesity.  Follow these instructions at home:  Eating and drinking    · Follow recommendations from your health care provider about what you eat and drink. Your health care provider may advise you to:  ? Limit fast food, sweets, and processed snack foods.  ? Choose low-fat options, such as low-fat milk instead of whole milk.  ? Eat 5 or more servings of fruits or vegetables every day.  ? Eat at home more often. This gives you more control over what you eat.  ? Choose healthy foods when you eat out.  ? Learn to read food labels. This will help you understand how much food is considered 1 serving.  ? Learn what a healthy serving size is.  ? Keep low-fat snacks available.  ? Limit sugary drinks, such as soda, fruit juice, sweetened iced tea, and flavored milk.  · Drink enough water to keep your urine pale yellow.  · Do not follow a fad diet. Fad diets can be unhealthy and even dangerous.  Physical activity  · Exercise regularly, as told by your health care provider.  ? Most adults should get up to 150 minutes of moderate-intensity exercise every week.  ? Ask your health care provider what types of exercise are safe for you and how often you should exercise.  · Warm up and stretch before being active.  · Cool down and stretch after being  active.  · Rest between periods of activity.  Lifestyle  · Work with your health care provider and a dietitian to set a weight-loss goal that is healthy and reasonable for you.  · Limit your screen time.  · Find ways to reward yourself that do not involve food.  · Do not drink alcohol if:  ? Your health care provider tells you not to drink.  ? You are pregnant, may be pregnant, or are planning to become pregnant.  · If you drink alcohol:  ? Limit how much you use to:  § 0-1 drink a day for women.  § 0-2 drinks a day for men.  ? Be aware of how much alcohol is in your drink. In the [place] one drink equals one 12 oz bottle of beer (355 mL), one 5 oz glass of wine (148 mL), or one 1½ oz glass of hard liquor (44 mL).  General instructions  · Keep a weight-loss journal to keep track of the food you eat and how much exercise you get.  · Take over-the-counter and prescription medicines only as told by your health care provider.  · Take vitamins and supplements only as told by your health care provider.  · Consider joining a support group. Your health care provider may be able to recommend a support group.  · Keep all follow-up visits as told by your health care provider. This is important.  Contact a health care provider if:  · You are unable to meet your weight loss goal after 6 weeks of dietary and lifestyle changes.  Get help right away if you are having:  · Trouble breathing.  · Suicidal thoughts or behaviors.  Summary  · Obesity is the condition of having too much total body fat.  · Being overweight or obese means that your weight is greater than what is considered healthy for your body size.  · Work with your health care provider and a dietitian to set a weight-loss goal that is healthy and reasonable for you.  · Exercise regularly, as told by your health care provider. Ask your health care provider what types of exercise are safe for you and how often you should exercise.  This information is not intended to replace  advice given to you by your health care provider. Make sure you discuss any questions you have with your health care provider.  Document Released: 01/25/2006 Document Revised: 08/22/2019 Document Reviewed: 08/22/2019  [employer] Patient Education © 2020 [employer]    Preventive Care 21-39 Years Old, Female  Preventive care refers to visits with your health care provider and lifestyle choices that can promote health and wellness. This includes:  · A yearly physical exam. This may also be called an annual well check.  · Regular dental visits and eye exams.  · Immunizations.  · Screening for certain conditions.  · Healthy lifestyle choices, such as eating a healthy diet, getting regular exercise, not using drugs or products that contain nicotine and tobacco, and limiting alcohol use.  What can I expect for my preventive care visit?  Physical exam  Your health care provider will check your:  · Height and weight. This may be used to calculate body mass index (BMI), which tells if you are at a healthy weight.  · Heart rate and blood pressure.  · Skin for abnormal spots.  Counseling  Your health care provider may ask you questions about your:  · Alcohol, tobacco, and drug use.  · Emotional well-being.  · Home and relationship well-being.  · Sexual activity.  · Eating habits.  · Work and work environment.  · Method of birth control.  · Menstrual cycle.  · Pregnancy history.  What immunizations do I need?    Influenza (flu) vaccine  · This is recommended every year.  Tetanus, diphtheria, and pertussis (Tdap) vaccine  · You may need a Td booster every 10 years.  Varicella (chickenpox) vaccine  · You may need this if you have not been vaccinated.  Human papillomavirus (HPV) vaccine  · If recommended by your health care provider, you may need three doses over 6 months.  Measles, mumps, and rubella (MMR) vaccine  · You may need at least one dose of MMR. You may also need a second dose.  Meningococcal conjugate (MenACWY)  vaccine  · One dose is recommended if you are age 19-21 years and a first-year college student living in a residence deleon, or if you have one of several medical conditions. You may also need additional booster doses.  Pneumococcal conjugate (PCV13) vaccine  · You may need this if you have certain conditions and were not previously vaccinated.  Pneumococcal polysaccharide (PPSV23) vaccine  · You may need one or two doses if you smoke cigarettes or if you have certain conditions.  Hepatitis A vaccine  · You may need this if you have certain conditions or if you travel or work in places where you may be exposed to hepatitis A.  Hepatitis B vaccine  · You may need this if you have certain conditions or if you travel or work in places where you may be exposed to hepatitis B.  Haemophilus influenzae type b (Hib) vaccine  · You may need this if you have certain conditions.  You may receive vaccines as individual doses or as more than one vaccine together in one shot (combination vaccines). Talk with your health care provider about the risks and benefits of combination vaccines.  What tests do I need?    Blood tests  · Lipid and cholesterol levels. These may be checked every 5 years starting at age 20.  · Hepatitis C test.  · Hepatitis B test.  Screening  · Diabetes screening. This is done by checking your blood sugar (glucose) after you have not eaten for a while (fasting).  · Sexually transmitted disease (STD) testing.  · BRCA-related cancer screening. This may be done if you have a family history of breast, ovarian, tubal, or peritoneal cancers.  · Pelvic exam and Pap test. This may be done every 3 years starting at age 21. Starting at age 30, this may be done every 5 years if you have a Pap test in combination with an HPV test.  Talk with your health care provider about your test results, treatment options, and if necessary, the need for more tests.  Follow these instructions at home:  Eating and drinking    · Eat a  diet that includes fresh fruits and vegetables, whole grains, lean protein, and low-fat dairy.  · Take vitamin and mineral supplements as recommended by your health care provider.  · Do not drink alcohol if:  ? Your health care provider tells you not to drink.  ? You are pregnant, may be pregnant, or are planning to become pregnant.  · If you drink alcohol:  ? Limit how much you have to 0-1 drink a day.  ? Be aware of how much alcohol is in your drink. In the [place] one drink equals one 12 oz bottle of beer (355 mL), one 5 oz glass of wine (148 mL), or one 1½ oz glass of hard liquor (44 mL).  Lifestyle  · Take daily care of your teeth and gums.  · Stay active. Exercise for at least 30 minutes on 5 or more days each week.  · Do not use any products that contain nicotine or tobacco, such as cigarettes, e-cigarettes, and chewing tobacco. If you need help quitting, ask your health care provider.  · If you are sexually active, practice safe sex. Use a condom or other form of birth control (contraception) in order to prevent pregnancy and STIs (sexually transmitted infections). If you plan to become pregnant, see your health care provider for a preconception visit.  What's next?  · Visit your health care provider once a year for a well check visit.  · Ask your health care provider how often you should have your eyes and teeth checked.  · Stay up to date on all vaccines.  This information is not intended to replace advice given to you by your health care provider. Make sure you discuss any questions you have with your health care provider.  Document Released: 02/13/2003 Document Revised: 08/29/2019 Document Reviewed: 08/29/2019  [employer] Patient Education © 2020 [employer]

## 2020-06-09 NOTE — PROGRESS NOTES
{SnapShot  Notes  Encounters  Result Review  Labs  Imaging  Meds  Media :23}    Subjective   Delores Sprague is a 29 y.o. female and is here for a comprehensive physical exam. The patient reports no problems.    HPI: Doing well overall with no new concerns. Anxiety is well controlled with Zoloft. No SI or HI.       Health Habits:  Eye exam within last 2 years? Due now, referral ordered.  Dental exam every 6 months? No, overdue and referral declined today.   Exercise habits: few days per week  Healthy diet? Attempting, needs improvement.    The ASCVD Risk score ([place] ЕЛЕНА Pereira., et al., 2013) failed to calculate for the following reasons:    The 2013 ASCVD risk score is only valid for ages 40 to 79        Do you take any herbs or supplements that were not prescribed by a doctor? no  Are you taking calcium supplements? No  Are you taking aspirin daily? No     History:  LMP: No LMP recorded. (Menstrual status: Tubal ligation).  Menopause: No  Last pap date: 2017  Abnormal pap? no  Family history of breast or ovarian cancer: yes, mother breast cancer         OB History   No data available     Sexual activity questions deferred to the physician.        The following portions of the patient's history were reviewed and updated as appropriate: She  has a past medical history of Arthritis, Asthma, Depression, Diverticulosis, GERD (gastroesophageal reflux disease), Lupus (CMS/HCC), and Seasonal allergies.  She does not have any pertinent problems on file.  She  has a past surgical history that includes  section; Salpingectomy (Bilateral); Laparoscopic cholecystectomy (2019); and Gallbladder surgery.  Her family history includes Breast cancer in her mother and another family member; COPD in her maternal grandfather; Diabetes in her father and mother; Hyperlipidemia in her mother; Hypertension in her mother; Liver cancer in an other family member; Lung cancer in her maternal grandmother and another  family member; Mental illness in her father; Pancreatic cancer in her paternal grandfather; Uterine cancer in an other family member.  She  reports that she has never smoked. She has never used smokeless tobacco. She reports that she does not drink alcohol or use drugs.  Current Outpatient Medications   Medication Sig Dispense Refill   • busPIRone (BUSPAR) 5 MG tablet Take 1-2 tablets by mouth every 8 hours as needed. 180 tablet 5   • cetirizine (zyrTEC) 10 MG tablet Take 1 tablet by mouth Daily. 30 tablet 0   • eletriptan (Relpax) 40 MG tablet Take 1 tablet by mouth 1 (One) Time As Needed for Migraine for up to 1 dose. may repeat in 2 hours if necessary 6 tablet 5   • fluticasone-salmeterol (ADVAIR) 250-50 MCG/DOSE DISKUS Inhale 1 puff 2 (Two) Times a Day. 60 each 5   • montelukast (SINGULAIR) 10 MG tablet Take 1 tablet by mouth Every Night. 30 tablet 5   • ondansetron (ZOFRAN) 4 MG tablet Take 1 tablet by mouth Every 6 (Six) Hours As Needed for Nausea. 16 tablet 0   • promethazine (PHENERGAN) 25 MG tablet Take 1 tablet by mouth Every 6 (Six) Hours As Needed for Nausea or Vomiting. 15 tablet 0   • sertraline (ZOLOFT) 50 MG tablet Take 1.5 tablets by mouth Daily. 135 tablet 3     No current facility-administered medications for this visit.        Review of Systems  Do you have pain that bothers you in your daily life? no    Review of Systems   Constitutional: Negative for activity change, appetite change, chills, diaphoresis, fatigue, fever, unexpected weight gain and unexpected weight loss.   HENT: Positive for postnasal drip and rhinorrhea. Negative for congestion, dental problem, ear pain, mouth sores, sinus pressure, sneezing, sore throat, swollen glands, trouble swallowing and voice change.    Eyes: Negative for blurred vision, double vision, pain, discharge, redness, itching and visual disturbance.   Respiratory: Negative for cough, choking, chest tightness, shortness of breath and wheezing.    Cardiovascular:  "Negative for chest pain, palpitations and leg swelling.   Gastrointestinal: Negative for abdominal distention, abdominal pain, blood in stool, constipation, diarrhea, nausea, rectal pain, vomiting, GERD and indigestion.   Endocrine: Negative for cold intolerance, heat intolerance, polydipsia, polyphagia and polyuria.   Genitourinary: Negative for breast discharge, breast lump, breast pain, decreased libido, decreased urine volume, difficulty urinating, dysuria, flank pain, frequency, hematuria, pelvic pain, urgency, vaginal bleeding and vaginal pain.   Musculoskeletal: Negative for arthralgias, back pain, gait problem, myalgias and neck pain.   Skin: Negative for color change, rash and skin lesions.   Allergic/Immunologic: Positive for environmental allergies. Negative for immunocompromised state.   Neurological: Positive for headache (occasional). Negative for dizziness, tremors, facial asymmetry, speech difficulty, weakness, light-headedness, numbness, memory problem and confusion.   Hematological: Negative for adenopathy. Does not bruise/bleed easily.   Psychiatric/Behavioral: Negative for agitation, behavioral problems, decreased concentration, hallucinations, self-injury, sleep disturbance, suicidal ideas, negative for hyperactivity, depressed mood and stress. The patient is nervous/anxious (stable).          Objective   /70   Pulse 81   Temp 97.1 °F (36.2 °C)   Ht 172.7 cm (67.99\")   Wt 136 kg (299 lb)   SpO2 99%   BMI 45.47 kg/m²     Physical Exam   Constitutional: She is oriented to person, place, and time. She appears well-developed and well-nourished. No distress.   Obese.    HENT:   Head: Normocephalic and atraumatic.   Eyes: Pupils are equal, round, and reactive to light. Conjunctivae and EOM are normal. No scleral icterus.   Neck: Normal range of motion. Neck supple. No thyromegaly present.   Cardiovascular: Normal rate, regular rhythm and normal heart sounds. Exam reveals no gallop and no " friction rub.   No murmur heard.  Pulmonary/Chest: Effort normal and breath sounds normal. No respiratory distress. She has no wheezes. She has no rales. She exhibits no tenderness.   Abdominal: Soft. Bowel sounds are normal. She exhibits no distension and no mass. There is no tenderness. There is no rebound.   Musculoskeletal: Normal range of motion. She exhibits no edema, tenderness or deformity.   Lymphadenopathy:     She has no cervical adenopathy.   Neurological: She is alert and oriented to person, place, and time. She displays normal reflexes. No cranial nerve deficit or sensory deficit.   Skin: Skin is warm and dry. Capillary refill takes less than 2 seconds. No rash noted. She is not diaphoretic. No pallor.   Psychiatric: She has a normal mood and affect. Her behavior is normal. Judgment and thought content normal.   Nursing note and vitals reviewed.         Assessment/Plan   Healthy female exam.     1.    Diagnosis Plan   1. Annual physical exam     2. Class 3 severe obesity due to excess calories without serious comorbidity with body mass index (BMI) of 45.0 to 49.9 in adult (CMS/[facility])  Patient's Body mass index is 45.47 kg/m². BMI is above normal parameters. Recommendations include: educational material, exercise counseling and nutrition counseling.     3. Encounter for vision screening  Ambulatory Referral to Optometry     4. Generalized anxiety disorder  Stable, continue Zoloft and Buspar.          2. Patient Counseling:  --Nutrition: Stressed importance of moderation in sodium/caffeine intake, saturated fat and cholesterol, caloric balance, sufficient intake of fresh fruits, vegetables, fiber, calcium, iron, and 1 g folate supplementation if of childbearing age.   --Discussed the issue of calcium supplement, and the daily use of baby aspirin if applicable.             --Mammogram recommended every 2 years from age 40-49 and yearly beginning at age 50.  --Exercise: Stressed the importance of regular  exercise.   --Substance Abuse: Discussed cessation/primary prevention of tobacco (if applicable), alcohol, or other drug use (if applicable); driving or other dangerous activities under the influence; availability of treatment for abuse.    --Sexuality: Discussed sexually transmitted diseases, partner selection, use of condoms, avoidance of unintended pregnancy  and contraceptive alternatives.   --Injury prevention: Discussed safety belts, safety helmets, smoke detector, smoking near bedding or upholstery.   --Dental health: Discussed importance of regular tooth brushing, flossing, and dental visits every 6 months.  --Immunizations reviewed. She declined pneumovax today.   --Discussed benefits of screening colonoscopy (if applicable).  --After hours service discussed with patient.    3. Discussed the patient's BMI with her.  The BMI is above average; BMI management plan is completed  4. Return in about 3 months (around 9/9/2020) for Next scheduled follow up, PAP.         NATHALIA Ag  06/09/2020  2:57 PM

## 2020-06-09 NOTE — PROGRESS NOTES
{SnapShot  Notes  Encounters  Result Review  Labs  Imaging  Meds  Media :23}    Subjective   Delores Sprague is a 29 y.o. female and is here for a comprehensive physical exam. The patient reports {problems:42296}.    HPI:    TDAP, Pneumovax?        Health Habits:  Eye exam within last 2 years?  Dental exam every 6 months?  Exercise habits:  Healthy diet?    The ASCVD Risk score ([place] CHRISTIANSEN Jr., et al., 2013) failed to calculate for the following reasons:    The 2013 ASCVD risk score is only valid for ages 40 to 79        Do you take any herbs or supplements that were not prescribed by a doctor? {yes/no/not asked:9010}  Are you taking calcium supplements? {YES/NA/NO:26970}  Are you taking aspirin daily? {YES/NA/NO:92032}     History:  LMP: No LMP recorded. (Menstrual status: Tubal ligation).  Menopause: {Yes/No/WC:42688}  Last pap date: ***  Abnormal pap? {yes/no:9010}  Family history of breast or ovarian cancer: {Yes/no/unknown:22544}         OB History   No data available     Sexual activity questions deferred to the physician.        The following portions of the patient's history were reviewed and updated as appropriate: She  has a past medical history of Arthritis, Asthma, Depression, Diverticulosis, GERD (gastroesophageal reflux disease), Lupus (CMS/HCC), and Seasonal allergies.  She does not have any pertinent problems on file.  She  has a past surgical history that includes  section; Salpingectomy (Bilateral); Laparoscopic cholecystectomy (2019); and Gallbladder surgery.  Her family history includes Breast cancer in an other family member; COPD in her maternal grandfather; Diabetes in her father and mother; Hyperlipidemia in her mother; Hypertension in her mother; Liver cancer in an other family member; Lung cancer in her maternal grandmother and another family member; Mental illness in her father; Pancreatic cancer in her paternal grandfather; Uterine cancer in an other family  member.  She  reports that she has never smoked. She has never used smokeless tobacco. She reports that she does not drink alcohol or use drugs.  Current Outpatient Medications   Medication Sig Dispense Refill   • busPIRone (BUSPAR) 5 MG tablet Take 1-2 tablets by mouth every 8 hours as needed. 180 tablet 5   • cetirizine (zyrTEC) 10 MG tablet Take 1 tablet by mouth Daily. 30 tablet 0   • eletriptan (Relpax) 40 MG tablet Take 1 tablet by mouth 1 (One) Time As Needed for Migraine for up to 1 dose. may repeat in 2 hours if necessary 6 tablet 5   • fluticasone-salmeterol (ADVAIR) 250-50 MCG/DOSE DISKUS Inhale 1 puff 2 (Two) Times a Day. 60 each 5   • montelukast (SINGULAIR) 10 MG tablet Take 1 tablet by mouth Every Night. 30 tablet 5   • ondansetron (ZOFRAN) 4 MG tablet Take 1 tablet by mouth Every 6 (Six) Hours As Needed for Nausea. 16 tablet 0   • promethazine (PHENERGAN) 25 MG tablet Take 1 tablet by mouth Every 6 (Six) Hours As Needed for Nausea or Vomiting. 15 tablet 0   • sertraline (ZOLOFT) 50 MG tablet Take 1.5 tablets by mouth Daily. 135 tablet 3     No current facility-administered medications for this visit.        Review of Systems  Do you have pain that bothers you in your daily life? {yes/no/not asked:9037}    Review of Systems      Objective   There were no vitals taken for this visit.    Physical Exam       Assessment/Plan   Healthy female exam.     1.    Diagnosis Plan   1. Annual physical exam           2. Patient Counseling:  --Nutrition: Stressed importance of moderation in sodium/caffeine intake, saturated fat and cholesterol, caloric balance, sufficient intake of fresh fruits, vegetables, fiber, calcium, iron, and 1 g folate supplementation if of childbearing age.   --Discussed the issue of calcium supplement, and the daily use of baby aspirin if applicable.             --Mammogram recommended every 2 years from age 40-49 and yearly beginning at age 50.  --Exercise: Stressed the importance of  regular exercise.   --Substance Abuse: Discussed cessation/primary prevention of tobacco (if applicable), alcohol, or other drug use (if applicable); driving or other dangerous activities under the influence; availability of treatment for abuse.    --Sexuality: Discussed sexually transmitted diseases, partner selection, use of condoms, avoidance of unintended pregnancy  and contraceptive alternatives.   --Injury prevention: Discussed safety belts, safety helmets, smoke detector, smoking near bedding or upholstery.   --Dental health: Discussed importance of regular tooth brushing, flossing, and dental visits every 6 months.  --Immunizations reviewed.  --Discussed benefits of screening colonoscopy (if applicable).  --After hours service discussed with patient    3. Discussed the patient's BMI with her.  The BMI {[facility]91328}  4. No follow-ups on file.         NATHALIA Ag  06/09/2020  11:14 AM

## 2020-06-16 DIAGNOSIS — G47.00 INSOMNIA, UNSPECIFIED TYPE: Primary | ICD-10-CM

## 2020-06-16 RX ORDER — TRAZODONE HYDROCHLORIDE 50 MG/1
TABLET ORAL
Qty: 30 TABLET | Refills: 2 | Status: SHIPPED | OUTPATIENT
Start: 2020-06-16 | End: 2020-11-16

## 2020-06-18 DIAGNOSIS — J45.40 MODERATE PERSISTENT ASTHMA WITHOUT COMPLICATION: Primary | ICD-10-CM

## 2020-06-18 RX ORDER — ALBUTEROL SULFATE 90 UG/1
2 AEROSOL, METERED RESPIRATORY (INHALATION) EVERY 4 HOURS PRN
Qty: 1 INHALER | Refills: 11 | Status: SHIPPED | OUTPATIENT
Start: 2020-06-18

## 2020-07-12 DIAGNOSIS — F41.1 GENERALIZED ANXIETY DISORDER: ICD-10-CM

## 2020-07-13 DIAGNOSIS — J06.9 ACUTE URI: ICD-10-CM

## 2020-07-13 RX ORDER — CETIRIZINE HYDROCHLORIDE 10 MG/1
10 TABLET ORAL DAILY
Qty: 90 TABLET | Refills: 3 | Status: SHIPPED | OUTPATIENT
Start: 2020-07-13

## 2020-08-11 ENCOUNTER — OFFICE VISIT (OUTPATIENT)
Dept: INTERNAL MEDICINE | Facility: CLINIC | Age: 30
End: 2020-08-11

## 2020-08-11 ENCOUNTER — RESULTS ENCOUNTER (OUTPATIENT)
Dept: INTERNAL MEDICINE | Facility: CLINIC | Age: 30
End: 2020-08-11

## 2020-08-11 VITALS
SYSTOLIC BLOOD PRESSURE: 136 MMHG | OXYGEN SATURATION: 98 % | TEMPERATURE: 97.1 F | BODY MASS INDEX: 44.41 KG/M2 | HEIGHT: 68 IN | HEART RATE: 82 BPM | WEIGHT: 293 LBS | DIASTOLIC BLOOD PRESSURE: 78 MMHG

## 2020-08-11 DIAGNOSIS — E66.01 CLASS 3 SEVERE OBESITY DUE TO EXCESS CALORIES WITHOUT SERIOUS COMORBIDITY WITH BODY MASS INDEX (BMI) OF 45.0 TO 49.9 IN ADULT (HCC): ICD-10-CM

## 2020-08-11 DIAGNOSIS — R35.0 URINARY FREQUENCY: Primary | ICD-10-CM

## 2020-08-11 DIAGNOSIS — R35.0 URINARY FREQUENCY: ICD-10-CM

## 2020-08-11 DIAGNOSIS — R32 INCONTINENCE IN FEMALE: ICD-10-CM

## 2020-08-11 DIAGNOSIS — F41.1 GENERALIZED ANXIETY DISORDER: ICD-10-CM

## 2020-08-11 LAB
BILIRUB BLD-MCNC: NEGATIVE MG/DL
CLARITY, POC: CLEAR
COLOR UR: YELLOW
GLUCOSE UR STRIP-MCNC: NEGATIVE MG/DL
KETONES UR QL: NEGATIVE
LEUKOCYTE EST, POC: NEGATIVE
NITRITE UR-MCNC: NEGATIVE MG/ML
PH UR: 6 [PH] (ref 5–8)
PROT UR STRIP-MCNC: NEGATIVE MG/DL
RBC # UR STRIP: NEGATIVE /UL
SP GR UR: 1.02 (ref 1–1.03)
UROBILINOGEN UR QL: NORMAL

## 2020-08-11 PROCEDURE — 81003 URINALYSIS AUTO W/O SCOPE: CPT | Performed by: PHYSICIAN ASSISTANT

## 2020-08-11 PROCEDURE — 99214 OFFICE O/P EST MOD 30 MIN: CPT | Performed by: PHYSICIAN ASSISTANT
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
## 2020-08-13 RX ORDER — SERTRALINE HYDROCHLORIDE 100 MG/1
100 TABLET, FILM COATED ORAL DAILY
Qty: 90 TABLET | Refills: 1 | Status: SHIPPED | OUTPATIENT
Start: 2020-08-13 | End: 2021-02-11

## 2020-08-14 DIAGNOSIS — R35.0 URINARY FREQUENCY: Primary | ICD-10-CM

## 2020-08-14 RX ORDER — NITROFURANTOIN 25; 75 MG/1; MG/1
100 CAPSULE ORAL 2 TIMES DAILY
Qty: 14 CAPSULE | Refills: 0 | Status: SHIPPED | OUTPATIENT
Start: 2020-08-14 | End: 2020-08-21

## 2020-08-14 NOTE — PROGRESS NOTES
Urine culture grew a small amount of bacteria that typically does not require treatment but due to her symptoms I would like her to complete the 7 day course of Macrobid I sent to her pharmacy.

## 2020-10-09 ENCOUNTER — OFFICE VISIT (OUTPATIENT)
Dept: INTERNAL MEDICINE | Facility: CLINIC | Age: 30
End: 2020-10-09

## 2020-10-09 VITALS
TEMPERATURE: 98.2 F | OXYGEN SATURATION: 98 % | HEART RATE: 81 BPM | DIASTOLIC BLOOD PRESSURE: 84 MMHG | SYSTOLIC BLOOD PRESSURE: 128 MMHG | HEIGHT: 68 IN | BODY MASS INDEX: 44.41 KG/M2 | WEIGHT: 293 LBS

## 2020-10-09 DIAGNOSIS — M25.562 LEFT ANTERIOR KNEE PAIN: Primary | ICD-10-CM

## 2020-10-09 DIAGNOSIS — N92.6 IRREGULAR MENSES: ICD-10-CM

## 2020-10-09 DIAGNOSIS — E28.2 PCOS (POLYCYSTIC OVARIAN SYNDROME): ICD-10-CM

## 2020-10-09 DIAGNOSIS — M25.462 KNEE EFFUSION, LEFT: ICD-10-CM

## 2020-10-09 PROCEDURE — 99214 OFFICE O/P EST MOD 30 MIN: CPT | Performed by: PHYSICIAN ASSISTANT

## 2020-10-09 RX ORDER — TROSPIUM CHLORIDE 20 MG/1
20 TABLET, FILM COATED ORAL 2 TIMES DAILY
COMMUNITY
Start: 2020-09-19

## 2020-10-09 NOTE — PROGRESS NOTES
Delores Sprgaue is a 29 y.o. female.     Subjective   History of Present Illness   Here today with concern of abnormal menses. Around when menses was due 1 month ago she began having brown to black spotting, cramping and lower back pain which persisted off and on then yesterday she started what seems to be a normal period. Cramping and lower back pain over the last month were worse than she normally experiences.  Menses have always been irregular but she has had no previous similar occurrences. She is s/p bilateral salpingectomy and denies possible pregnancy. She endorses previous diagnosis of PCOS which she does not believe has ever been treated. She has had significant trouble losing weight.     Today she is also concerned with around 3 years of swelling in the left proximal anterior lower leg which has become more prominent and is now painful intermittently when walking at times which is new in the last couple of months. She reports that the compression from pants helps prevent the pain as it seems to stop it from jiggling.  She also has had around 3 years of intermittent left anterior and inferior knee pain if she has been on her feet all day which may also be worsening since onset. Pain began after feeling a pop in the knee.     Anxiety has been very bothersome recently. She is taking Zoloft and continues to see a counselor which helps. No SI or HI.        The following portions of the patient's history were reviewed and updated as appropriate: allergies, current medications, past family history, past medical history, past social history, past surgical history and problem list.    Review of Systems   Constitutional: Negative for activity change, chills, fatigue and fever.   HENT: Negative.    Eyes: Negative.  Negative for visual disturbance.   Respiratory: Negative for cough, chest tightness, shortness of breath and wheezing.    Cardiovascular: Negative for chest pain, palpitations and leg swelling.    Gastrointestinal: Negative for abdominal pain, constipation, diarrhea, nausea and vomiting.   Endocrine: Negative for polydipsia, polyphagia and polyuria.   Genitourinary: Positive for menstrual problem and vaginal bleeding. Negative for amenorrhea, breast pain, decreased urine volume, difficulty urinating, dysuria, flank pain, frequency, genital sores, pelvic pain, pelvic pressure, vaginal discharge and vaginal pain.   Musculoskeletal: Positive for arthralgias, gait problem and joint swelling. Negative for neck pain and neck stiffness.   Skin: Negative.    Allergic/Immunologic: Negative for immunocompromised state.   Neurological: Negative for dizziness, speech difficulty, weakness, headache and confusion.   Hematological: Negative for adenopathy. Does not bruise/bleed easily.   Psychiatric/Behavioral: Negative for agitation, hallucinations, sleep disturbance, suicidal ideas and depressed mood. The patient is nervous/anxious.          Objective    Physical Exam  Vitals signs and nursing note reviewed.   Constitutional:       General: She is not in acute distress.     Appearance: She is well-developed. She is obese. She is not ill-appearing, toxic-appearing or diaphoretic.   HENT:      Head: Normocephalic and atraumatic.   Eyes:      General: No scleral icterus.     Conjunctiva/sclera: Conjunctivae normal.      Pupils: Pupils are equal, round, and reactive to light.   Neck:      Musculoskeletal: Normal range of motion and neck supple.   Cardiovascular:      Rate and Rhythm: Normal rate and regular rhythm.      Heart sounds: Normal heart sounds. No murmur. No friction rub. No gallop.    Pulmonary:      Effort: Pulmonary effort is normal. No respiratory distress.      Breath sounds: Normal breath sounds. No wheezing, rhonchi or rales.   Chest:      Chest wall: No tenderness.   Abdominal:      General: Bowel sounds are normal.      Palpations: Abdomen is soft.      Tenderness: There is no abdominal tenderness. There  "is no right CVA tenderness or left CVA tenderness.   Musculoskeletal: Normal range of motion.         General: No deformity.      Left knee: She exhibits swelling and effusion. She exhibits normal range of motion, no ecchymosis, no deformity, no laceration, no erythema, normal alignment, no LCL laxity, normal patellar mobility, no bony tenderness and no MCL laxity. Tenderness found. Medial joint line and MCL tenderness noted.      Right lower leg: No edema.      Left lower leg: No edema.        Legs:    Skin:     General: Skin is warm and dry.      Capillary Refill: Capillary refill takes less than 2 seconds.      Findings: No rash.   Neurological:      General: No focal deficit present.      Mental Status: She is alert and oriented to person, place, and time.      Cranial Nerves: No cranial nerve deficit.      Sensory: No sensory deficit.      Motor: No weakness or abnormal muscle tone.      Coordination: Coordination normal.      Gait: Gait normal.      Deep Tendon Reflexes: Reflexes normal.   Psychiatric:         Mood and Affect: Mood normal.         Behavior: Behavior normal.         Thought Content: Thought content normal.         Judgment: Judgment normal.           /84   Pulse 81   Temp 98.2 °F (36.8 °C)   Ht 172.7 cm (67.99\")   Wt (!) 137 kg (301 lb)   SpO2 98%   BMI 45.78 kg/m²     Nursing note and vitals reviewed.          Assessment/Plan   Delores was seen today for vaginal discharge.    Diagnoses and all orders for this visit:    Left anterior knee pain  Knee effusion, left  -     XR Knee 3 View Left    Suspect pain in proximal anterior lower leg is secondary to effusion from knee injury. Will initiate workup with knee x-ray and consider orthopedic surgery referral after review.     Irregular menses  -     Insulin, Total  -     Testosterone  -     TSH Rfx On Abnormal To Free T4  -     Thyroid Peroxidase Antibody    PCOS (polycystic ovarian syndrome)  -     Insulin, Total  -     " Testosterone    Will consider transvaginal ultrasound if labs unremarkable.              NATHALIA Ag  10/09/2020  08:50 EDT

## 2020-10-13 ENCOUNTER — HOSPITAL ENCOUNTER (OUTPATIENT)
Dept: GENERAL RADIOLOGY | Facility: HOSPITAL | Age: 30
Discharge: HOME | End: 2020-10-13
Admitting: PHYSICIAN ASSISTANT

## 2020-10-13 PROCEDURE — 73562 X-RAY EXAM OF KNEE 3: CPT

## 2020-10-14 LAB
INSULIN SERPL-ACNC: 28.2 UIU/ML (ref 2.6–24.9)
TESTOST SERPL-MCNC: 26 NG/DL (ref 8–48)
THYROPEROXIDASE AB SERPL-ACNC: 55 IU/ML (ref 0–34)
TSH SERPL DL<=0.005 MIU/L-ACNC: 3.08 UIU/ML (ref 0.27–4.2)

## 2020-10-15 DIAGNOSIS — M22.8X2 PATELLAR TRACKING DISORDER OF LEFT KNEE: ICD-10-CM

## 2020-10-15 DIAGNOSIS — M77.9 BONE SPUR: ICD-10-CM

## 2020-10-15 DIAGNOSIS — M25.462 KNEE EFFUSION, LEFT: ICD-10-CM

## 2020-10-15 DIAGNOSIS — E03.8 HYPOTHYROIDISM DUE TO HASHIMOTO'S THYROIDITIS: ICD-10-CM

## 2020-10-15 DIAGNOSIS — E06.3 HYPOTHYROIDISM DUE TO HASHIMOTO'S THYROIDITIS: ICD-10-CM

## 2020-10-15 DIAGNOSIS — M25.562 LEFT ANTERIOR KNEE PAIN: Primary | ICD-10-CM

## 2020-10-15 DIAGNOSIS — E28.2 PCOS (POLYCYSTIC OVARIAN SYNDROME): Primary | ICD-10-CM

## 2020-10-15 DIAGNOSIS — R76.8 THYROID ANTIBODY POSITIVE: ICD-10-CM

## 2020-10-15 PROBLEM — E88.81 INSULIN RESISTANCE: Status: ACTIVE | Noted: 2020-10-15

## 2020-10-15 PROBLEM — E88.819 INSULIN RESISTANCE: Status: ACTIVE | Noted: 2020-10-15

## 2020-10-15 RX ORDER — LEVOTHYROXINE SODIUM 0.05 MG/1
50 TABLET ORAL DAILY
Qty: 90 TABLET | Refills: 1 | Status: SHIPPED | OUTPATIENT
Start: 2020-10-15 | End: 2021-04-21

## 2020-10-15 RX ORDER — METFORMIN HYDROCHLORIDE 500 MG/1
500 TABLET, EXTENDED RELEASE ORAL
Qty: 90 TABLET | Refills: 1 | Status: SHIPPED | OUTPATIENT
Start: 2020-10-15 | End: 2021-01-07

## 2020-10-24 ENCOUNTER — TELEMEDICINE (OUTPATIENT)
Dept: INTERNAL MEDICINE | Facility: CLINIC | Age: 30
End: 2020-10-24

## 2020-10-24 DIAGNOSIS — E65 ABDOMINAL PANNUS: Primary | ICD-10-CM

## 2020-10-24 DIAGNOSIS — B37.2 YEAST DERMATITIS: ICD-10-CM

## 2020-10-24 PROCEDURE — 99213 OFFICE O/P EST LOW 20 MIN: CPT | Performed by: PHYSICIAN ASSISTANT

## 2020-10-24 RX ORDER — NYSTATIN 100000 U/G
OINTMENT TOPICAL 2 TIMES DAILY PRN
Qty: 30 G | Refills: 0 | Status: SHIPPED | OUTPATIENT
Start: 2020-10-24 | End: 2021-01-07

## 2020-10-24 NOTE — PROGRESS NOTES
You have chosen to receive care through a telehealth visit.  Do you consent to use a video/audio connection for your medical care today? Yes  Active parties: Jojo Alex PA-C, Swetha Vera CMA, Delores Celestinejennifer Sprague is a 29 y.o. female.     Subjective   History of Present Illness   Patient presents today via video visit with concern of recurrent rash in the suprapubic region.  She reports that ever since having a  section in 2017 she has had intermittent erythema, mild itching and mild odor in the skin fold near the incision site.  She noticed increased symptoms when she is exercising more regularly presumably due to increased sweating despite showering immediately after exercise and attempting to keep the area as dry as possible at all times.  She also notices increased symptoms during summer months.  She was recently diagnosed with hypothyroidism and started on levothyroxine which has helped improve her energy level so she is ready to resume her previous exercise regimen and attempt to lose weight.  She is worried about increased rash when her new exercise regimen begins.        The following portions of the patient's history were reviewed and updated as appropriate: allergies, current medications, past family history, past medical history, past social history, past surgical history and problem list.    Review of Systems   Constitutional: Positive for fatigue (improving). Negative for activity change, chills and fever.   HENT: Negative.    Eyes: Negative.  Negative for visual disturbance.   Respiratory: Negative for cough, chest tightness, shortness of breath and wheezing.    Cardiovascular: Negative for chest pain, palpitations and leg swelling.   Gastrointestinal: Negative for abdominal pain, constipation, diarrhea, nausea and vomiting.   Endocrine: Negative for polydipsia, polyphagia and polyuria.   Genitourinary: Negative.    Musculoskeletal: Negative.    Skin: Positive for  color change and rash (itching, odor).   Allergic/Immunologic: Negative for immunocompromised state.   Neurological: Negative for dizziness, speech difficulty, weakness, headache and confusion.   Hematological: Negative for adenopathy. Does not bruise/bleed easily.   Psychiatric/Behavioral: Negative for agitation, sleep disturbance and depressed mood. The patient is not nervous/anxious.          Objective    Physical Exam  Nursing note reviewed.   Constitutional:       General: She is not in acute distress.     Appearance: Normal appearance. She is well-developed. She is obese. She is not ill-appearing, toxic-appearing or diaphoretic.   HENT:      Head: Normocephalic and atraumatic.   Eyes:      Extraocular Movements: Extraocular movements intact.   Neck:      Musculoskeletal: Normal range of motion.   Pulmonary:      Effort: Pulmonary effort is normal. No respiratory distress.   Abdominal:      Comments: No abdominal tenderness to palpation performed by patient under my direction.   Musculoskeletal: Normal range of motion.         General: No tenderness or deformity.   Skin:     General: Skin is warm.      Findings: Rash (mild erythema and shiny appearance of the pannus ) present.      Comments: Skin is warm and dry per patient report upon physical exam direction by me.   Neurological:      Mental Status: She is alert and oriented to person, place, and time.      Motor: No abnormal muscle tone.   Psychiatric:         Mood and Affect: Mood normal.         Behavior: Behavior normal.         Thought Content: Thought content normal.         Judgment: Judgment normal.           There were no vitals taken for this visit.          Assessment/Plan   Diagnoses and all orders for this visit:    1. Abdominal pannus (Primary)  -     nystatin (MYCOSTATIN) 771141 UNIT/GM ointment; Apply  topically to the appropriate area as directed 2 (Two) Times a Day As Needed (rash).  Dispense: 30 g; Refill: 0    2. Yeast dermatitis  -      nystatin (MYCOSTATIN) 439900 UNIT/GM ointment; Apply  topically to the appropriate area as directed 2 (Two) Times a Day As Needed (rash).  Dispense: 30 g; Refill: 0        She is likely a good candidate for panniculectomy due to recurrent rash.  She would ultimately like to lose 30 pounds before considering surgery which I concur with.  Her  is in the  and has just received orders to move to [facility] around February 2021.  I recommended that referral for panniculectomy weight until she has moved and established with a new PCP as that will allow for time to lose the recommended amount of weight.  She is in favor of this plan.         NATHALIA Ag  10/24/2020  09:03 EDT

## 2020-11-16 DIAGNOSIS — G47.00 INSOMNIA, UNSPECIFIED TYPE: ICD-10-CM

## 2020-11-16 RX ORDER — TRAZODONE HYDROCHLORIDE 50 MG/1
TABLET ORAL
Qty: 90 TABLET | Refills: 3 | Status: SHIPPED | OUTPATIENT
Start: 2020-11-16

## 2020-11-23 DIAGNOSIS — M22.8X2 PATELLAR TRACKING DISORDER OF LEFT KNEE: ICD-10-CM

## 2020-11-23 DIAGNOSIS — M77.9 BONE SPUR: ICD-10-CM

## 2020-11-23 DIAGNOSIS — M25.562 LEFT ANTERIOR KNEE PAIN: Primary | ICD-10-CM

## 2020-11-23 DIAGNOSIS — M25.462 KNEE EFFUSION, LEFT: ICD-10-CM

## 2020-12-01 ENCOUNTER — OFFICE VISIT (OUTPATIENT)
Dept: ORTHOPEDIC SURGERY | Facility: CLINIC | Age: 30
End: 2020-12-01

## 2020-12-01 VITALS — BODY MASS INDEX: 44.41 KG/M2 | WEIGHT: 293 LBS | RESPIRATION RATE: 18 BRPM | HEIGHT: 68 IN

## 2020-12-01 DIAGNOSIS — M25.562 ARTHRALGIA OF LEFT KNEE: Primary | ICD-10-CM

## 2020-12-01 DIAGNOSIS — M94.262 CHONDROMALACIA OF LEFT KNEE: ICD-10-CM

## 2020-12-01 DIAGNOSIS — M23.92 INTERNAL DERANGEMENT OF LEFT KNEE: ICD-10-CM

## 2020-12-01 PROCEDURE — 99203 OFFICE O/P NEW LOW 30 MIN: CPT | Performed by: ORTHOPAEDIC SURGERY

## 2020-12-01 RX ORDER — MELOXICAM 15 MG/1
15 TABLET ORAL DAILY
Qty: 90 TABLET | Refills: 0 | Status: SHIPPED | OUTPATIENT
Start: 2020-12-01 | End: 2021-01-07

## 2020-12-01 RX ORDER — MELOXICAM 15 MG/1
15 TABLET ORAL DAILY
Qty: 30 TABLET | Refills: 1 | Status: SHIPPED | OUTPATIENT
Start: 2020-12-01 | End: 2020-12-01

## 2020-12-01 NOTE — PROGRESS NOTES
Subjective   Patient ID: Delores Sprague is a 29 y.o. female  Pain of the Left Knee (Patient is here today for left knee pain, she states 3 years ago she was on the floor and stood up when her knee popped. She states the pain has been tolerable until a few months ago when it popped again, she states she has been doing physical therapy wi)             History of Present Illness  29-year-old mom of 2 who remembers injuring her left knee 3 years ago over the last 3 to 4 months has had increasing pain with swelling on the medial aspect of her left knee especially going up and down stairs, minimal improvement by taking ibuprofen, has been very faithful attending physical therapy x-rays worsening of her anterior knee pain.  She is unable to sleep through the night last night.  Denies lower leg paresthesias swelling in the ankle proximal hip or back pain.  She is very active with her 2 young children and finds activity does aggravate the pain.      Review of Systems   Constitutional: Negative for fever.   HENT: Negative for dental problem and voice change.    Eyes: Negative for visual disturbance.   Respiratory: Negative for shortness of breath.    Cardiovascular: Negative for chest pain.   Gastrointestinal: Negative for abdominal pain.   Genitourinary: Negative for dysuria.   Musculoskeletal: Positive for arthralgias. Negative for gait problem and joint swelling.   Skin: Negative for rash.   Neurological: Negative for speech difficulty.   Hematological: Does not bruise/bleed easily.   Psychiatric/Behavioral: Negative for confusion.       Past Medical History:   Diagnosis Date   • Arthritis    • Asthma    • Depression    • Diverticulosis    • GERD (gastroesophageal reflux disease)    • Lupus (CMS/HCC)    • Seasonal allergies         Past Surgical History:   Procedure Laterality Date   •  SECTION     • GALLBLADDER SURGERY     • LAPAROSCOPIC CHOLECYSTECTOMY  2019   • SALPINGECTOMY Bilateral        Family  History   Problem Relation Age of Onset   • Diabetes Mother    • Hypertension Mother    • Hyperlipidemia Mother    • Breast cancer Mother         dx late 40s or early 50s   • Diabetes Father    • Mental illness Father    • Pancreatic cancer Paternal Grandfather    • Liver cancer Other    • Breast cancer Other    • Uterine cancer Other    • Lung cancer Other    • Lung cancer Maternal Grandmother    • COPD Maternal Grandfather        Social History     Socioeconomic History   • Marital status:      Spouse name: Not on file   • Number of children: Not on file   • Years of education: Not on file   • Highest education level: Not on file   Tobacco Use   • Smoking status: Never Smoker   • Smokeless tobacco: Never Used   Substance and Sexual Activity   • Alcohol use: No   • Drug use: No   • Sexual activity: Yes     Partners: Male     Birth control/protection: Surgical       I have reviewed the medical and surgical history, family history, social history, medications, and/or allergies, and the review of systems of this report.    No Known Allergies      Current Outpatient Medications:   •  albuterol sulfate  (90 Base) MCG/ACT inhaler, Inhale 2 puffs Every 4 (Four) Hours As Needed for Wheezing or Shortness of Air., Disp: 1 inhaler, Rfl: 11  •  busPIRone (BUSPAR) 5 MG tablet, Take 1-2 tablets by mouth every 8 hours as needed., Disp: 180 tablet, Rfl: 5  •  cetirizine (zyrTEC) 10 MG tablet, TAKE 1 TABLET BY MOUTH DAILY, Disp: 90 tablet, Rfl: 3  •  eletriptan (Relpax) 40 MG tablet, Take 1 tablet by mouth 1 (One) Time As Needed for Migraine for up to 1 dose. may repeat in 2 hours if necessary, Disp: 6 tablet, Rfl: 5  •  fluticasone-salmeterol (ADVAIR) 250-50 MCG/DOSE DISKUS, Inhale 1 puff 2 (Two) Times a Day., Disp: 60 each, Rfl: 5  •  levothyroxine (Synthroid) 50 MCG tablet, Take 1 tablet by mouth Daily., Disp: 90 tablet, Rfl: 1  •  meloxicam (MOBIC) 15 MG tablet, Take 1 tablet by mouth Daily., Disp: 30 tablet, Rfl:  "1  •  metFORMIN ER (GLUCOPHAGE-XR) 500 MG 24 hr tablet, Take 1 tablet by mouth Daily With Breakfast., Disp: 90 tablet, Rfl: 1  •  montelukast (SINGULAIR) 10 MG tablet, Take 1 tablet by mouth Every Night., Disp: 30 tablet, Rfl: 5  •  nystatin (MYCOSTATIN) 905837 UNIT/GM ointment, Apply  topically to the appropriate area as directed 2 (Two) Times a Day As Needed (rash)., Disp: 30 g, Rfl: 0  •  ondansetron (ZOFRAN) 4 MG tablet, Take 1 tablet by mouth Every 6 (Six) Hours As Needed for Nausea., Disp: 16 tablet, Rfl: 0  •  promethazine (PHENERGAN) 25 MG tablet, Take 1 tablet by mouth Every 6 (Six) Hours As Needed for Nausea or Vomiting., Disp: 15 tablet, Rfl: 0  •  sertraline (ZOLOFT) 100 MG tablet, Take 1 tablet by mouth Daily., Disp: 90 tablet, Rfl: 1  •  traZODone (DESYREL) 50 MG tablet, TAKE 1/2 TO 1 TABLET BY MOUTH DAILY AT NIGHT 30 MINUTES BEFORE BEDTIME AS NEEDED FOR SLEEP, Disp: 90 tablet, Rfl: 3  •  trospium (SANCTURA) 20 MG tablet, TK 1 T PO  BID FOR INCONTINENCE, Disp: , Rfl:     Objective   Resp 18   Ht 172.7 cm (68\")   Wt (!) 140 kg (308 lb)   BMI 46.83 kg/m²    Physical Exam  Constitutional: Patient is oriented to person, place, and time. Patient appears well-developed and well-nourished.   HENT:Head: Normocephalic and atraumatic.   Eyes: EOM are normal. Pupils are equal, round, and reactive to light.   Neck: Normal range of motion. Neck supple.   Cardiovascular: Normal rate.    Pulmonary/Chest: Effort normal and breath sounds normal.   Abdominal: Soft.   Neurological: Patient is alert and oriented to person, place, and time.   Skin: Skin is warm and dry.   Psychiatric: Patient has a normal mood and affect.   Nursing note and vitals reviewed.       [unfilled]   Left knee: Point tenderness over the anteromedial and posterior medial joint line positive Rc's finding full extension positive patellofemoral crepitus slight swelling noted over the proximal medial past anserine insertional area no midline " calf tenderness Homans' sign negative no lateral joint line pain ligament exam unremarkable negative straight leg raising.    Assessment/Plan   Review of Radiographic Studies:    Radiographic images today of affected area I personally viewed and showed no sign of acute fracture or dislocation.      Procedures     Diagnoses and all orders for this visit:    1. Arthralgia of left knee (Primary)  -     XR Knee 1 or 2 View Left    2. Chondromalacia of left knee    Other orders  -     Cancel: XR Knee 1 or 2 View Left  -     meloxicam (MOBIC) 15 MG tablet; Take 1 tablet by mouth Daily.  Dispense: 30 tablet; Refill: 1       Using illustrations and models, the nature of the pathology was explained to the patient      Recommendations/Plan:   Exercise, medications, injections, other patient advice, and return appointment as noted.    Patient agreeable to call or return sooner for any concerns.             Impression:  More than 3 months of increasing left knee pain rule out meniscal tear and/or chondromalacia patella  Plan:  Trial of meloxicam in place of ibuprofen, continue home exercises, MRI left knee, if MRI unremarkable consider steroid injection next visit

## 2020-12-09 ENCOUNTER — HOSPITAL ENCOUNTER (OUTPATIENT)
Dept: MRI IMAGING | Facility: HOSPITAL | Age: 30
Discharge: HOME | End: 2020-12-09
Admitting: ORTHOPAEDIC SURGERY

## 2020-12-09 DIAGNOSIS — M25.562 ARTHRALGIA OF LEFT KNEE: ICD-10-CM

## 2020-12-09 DIAGNOSIS — M23.92 INTERNAL DERANGEMENT OF LEFT KNEE: ICD-10-CM

## 2020-12-09 PROCEDURE — 73721 MRI JNT OF LWR EXTRE W/O DYE: CPT

## 2020-12-11 ENCOUNTER — APPOINTMENT (OUTPATIENT)
Dept: ULTRASOUND IMAGING | Facility: HOSPITAL | Age: 30
End: 2020-12-11

## 2020-12-11 ENCOUNTER — HOSPITAL ENCOUNTER (OUTPATIENT)
Dept: MRI IMAGING | Facility: HOSPITAL | Age: 30
End: 2020-12-11

## 2020-12-11 ENCOUNTER — HOSPITAL ENCOUNTER (EMERGENCY)
Facility: HOSPITAL | Age: 30
Discharge: HOME | End: 2020-12-11
Attending: EMERGENCY MEDICINE | Admitting: EMERGENCY MEDICINE

## 2020-12-11 VITALS
SYSTOLIC BLOOD PRESSURE: 161 MMHG | TEMPERATURE: 98 F | HEIGHT: 68 IN | BODY MASS INDEX: 44.41 KG/M2 | DIASTOLIC BLOOD PRESSURE: 100 MMHG | HEART RATE: 99 BPM | RESPIRATION RATE: 16 BRPM | WEIGHT: 293 LBS | OXYGEN SATURATION: 96 %

## 2020-12-11 DIAGNOSIS — S83.242S TEAR OF MEDIAL MENISCUS OF LEFT KNEE, CURRENT, UNSPECIFIED TEAR TYPE, SEQUELA: Primary | ICD-10-CM

## 2020-12-11 PROCEDURE — 93971 EXTREMITY STUDY: CPT

## 2020-12-11 PROCEDURE — 99282 EMERGENCY DEPT VISIT SF MDM: CPT

## 2020-12-11 RX ORDER — HYDROCODONE BITARTRATE AND ACETAMINOPHEN 5; 325 MG/1; MG/1
1 TABLET ORAL EVERY 6 HOURS PRN
Qty: 10 TABLET | Refills: 0 | Status: SHIPPED | OUTPATIENT
Start: 2020-12-11 | End: 2021-01-19

## 2020-12-12 NOTE — ED PROVIDER NOTES
Subjective   Patient comes in for 2 months of left knee pain but about a week and a half of left calf pain and tenderness.  She states had an MRI done recently of her left knee which showed a medial meniscus tear.  She states over the past week and half she has had new pain in left calf medially and laterally with decrease in station in her foot.  She has already done physical therapy for the left knee with no improvement.  She is here today for evaluation for possible DVT given calf involvement.          Review of Systems   Constitutional: Negative.    HENT: Negative.    Eyes: Negative.    Respiratory: Negative.    Cardiovascular: Negative.    Gastrointestinal: Negative.    Genitourinary: Negative.    Musculoskeletal:        Left knee pain and left calf pain with left lower extremity swelling below the knee   Skin: Negative.    Neurological: Negative.    Psychiatric/Behavioral: Negative.        Past Medical History:   Diagnosis Date   • Arthritis    • Asthma    • Depression    • Diverticulosis    • GERD (gastroesophageal reflux disease)    • Lupus (CMS/HCC)    • Seasonal allergies        No Known Allergies    Past Surgical History:   Procedure Laterality Date   •  SECTION     • GALLBLADDER SURGERY     • LAPAROSCOPIC CHOLECYSTECTOMY  2019   • SALPINGECTOMY Bilateral        Family History   Problem Relation Age of Onset   • Diabetes Mother    • Hypertension Mother    • Hyperlipidemia Mother    • Breast cancer Mother         dx late 40s or early 50s   • Diabetes Father    • Mental illness Father    • Pancreatic cancer Paternal Grandfather    • Liver cancer Other    • Breast cancer Other    • Uterine cancer Other    • Lung cancer Other    • Lung cancer Maternal Grandmother    • COPD Maternal Grandfather        Social History     Socioeconomic History   • Marital status:      Spouse name: Not on file   • Number of children: Not on file   • Years of education: Not on file   • Highest education level:  Not on file   Tobacco Use   • Smoking status: Never Smoker   • Smokeless tobacco: Never Used   Substance and Sexual Activity   • Alcohol use: No   • Drug use: No   • Sexual activity: Yes     Partners: Male     Birth control/protection: Surgical           Objective   Physical Exam  Constitutional:       Appearance: Normal appearance. She is obese.   HENT:      Head: Normocephalic and atraumatic.   Eyes:      Extraocular Movements: Extraocular movements intact.   Neck:      Musculoskeletal: Normal range of motion.   Cardiovascular:      Rate and Rhythm: Normal rate and regular rhythm.   Pulmonary:      Effort: Pulmonary effort is normal.   Musculoskeletal:      Comments: Tenderness to the left medial knee.  Tenderness to the left calf.  Right calf is 51 cm in circumference left calf is 52 and half centimeters in circumference.  2+ DP pulse on the left.  No cellulitis.  No ballotable effusion of the left knee.   Skin:     General: Skin is warm and dry.   Neurological:      General: No focal deficit present.      Mental Status: She is alert.   Psychiatric:         Mood and Affect: Mood normal.         Behavior: Behavior normal.         Procedures           ED Course                                           MDM    Final diagnoses:   Tear of medial meniscus of left knee, current, unspecified tear type, sequela            Aguilar Pickett PA-C  12/11/20 2027

## 2020-12-15 ENCOUNTER — OFFICE VISIT (OUTPATIENT)
Dept: ORTHOPEDIC SURGERY | Facility: CLINIC | Age: 30
End: 2020-12-15

## 2020-12-15 VITALS — WEIGHT: 293 LBS | HEIGHT: 68 IN | BODY MASS INDEX: 44.41 KG/M2 | TEMPERATURE: 96.8 F

## 2020-12-15 DIAGNOSIS — M94.262 CHONDROMALACIA OF LEFT KNEE: Primary | ICD-10-CM

## 2020-12-15 DIAGNOSIS — M32.9 HISTORY OF SYSTEMIC LUPUS ERYTHEMATOSUS (SLE) (HCC): ICD-10-CM

## 2020-12-15 DIAGNOSIS — M54.32 SCIATICA OF LEFT SIDE: ICD-10-CM

## 2020-12-15 PROCEDURE — 99214 OFFICE O/P EST MOD 30 MIN: CPT | Performed by: ORTHOPAEDIC SURGERY

## 2020-12-15 PROCEDURE — 20610 DRAIN/INJ JOINT/BURSA W/O US: CPT | Performed by: ORTHOPAEDIC SURGERY

## 2020-12-15 RX ORDER — TRIAMCINOLONE ACETONIDE 40 MG/ML
40 INJECTION, SUSPENSION INTRA-ARTICULAR; INTRAMUSCULAR
Status: COMPLETED | OUTPATIENT
Start: 2020-12-15 | End: 2020-12-15

## 2020-12-15 RX ORDER — LIDOCAINE HYDROCHLORIDE 10 MG/ML
2 INJECTION, SOLUTION INFILTRATION; PERINEURAL
Status: COMPLETED | OUTPATIENT
Start: 2020-12-15 | End: 2020-12-15

## 2020-12-15 RX ADMIN — TRIAMCINOLONE ACETONIDE 40 MG: 40 INJECTION, SUSPENSION INTRA-ARTICULAR; INTRAMUSCULAR at 11:57

## 2020-12-15 RX ADMIN — LIDOCAINE HYDROCHLORIDE 2 ML: 10 INJECTION, SOLUTION INFILTRATION; PERINEURAL at 11:57

## 2020-12-15 NOTE — PROGRESS NOTES
Subjective   Patient ID: Delores Sprague is a 30 y.o. female  Follow-up and Pain of the Left Knee (Patient is here today for her MRI results.)             History of Present Illness  She returns after MRI of her left knee which did show chondromalacia patella patellar tendinitis, no discrete meniscal tear, slight effusion.  Her pain is worse with activity climbing up and down stairs, had a flareup of such severe pain this weekend she went to the ER where she was evaluated with a negative scan for DVT of her left lower leg and told to follow-up with orthopedics.  She was given a prescription of pain medicines but prefers not to take pain medication.  She does have a history of lupus has not been seen for that for some time, left leg pain seems to radiate from the hip to the knee to the ankle and her foot falls asleep.  She has had chronic low back pain denies right lower leg paresthesias loss of strength or recent fall or injury.      Review of Systems   Constitutional: Negative for fever.   HENT: Negative for dental problem and voice change.    Eyes: Negative for visual disturbance.   Respiratory: Negative for shortness of breath.    Cardiovascular: Negative for chest pain.   Gastrointestinal: Negative for abdominal pain.   Genitourinary: Negative for dysuria.   Musculoskeletal: Positive for arthralgias. Negative for gait problem and joint swelling.   Skin: Negative for rash.   Neurological: Negative for speech difficulty.   Hematological: Does not bruise/bleed easily.   Psychiatric/Behavioral: Negative for confusion.       Past Medical History:   Diagnosis Date   • Arthritis    • Asthma    • Depression    • Diverticulosis    • GERD (gastroesophageal reflux disease)    • Lupus (CMS/HCC)    • Seasonal allergies         Past Surgical History:   Procedure Laterality Date   •  SECTION     • GALLBLADDER SURGERY     • LAPAROSCOPIC CHOLECYSTECTOMY  2019   • SALPINGECTOMY Bilateral        Family History    Problem Relation Age of Onset   • Diabetes Mother    • Hypertension Mother    • Hyperlipidemia Mother    • Breast cancer Mother         dx late 40s or early 50s   • Diabetes Father    • Mental illness Father    • Pancreatic cancer Paternal Grandfather    • Liver cancer Other    • Breast cancer Other    • Uterine cancer Other    • Lung cancer Other    • Lung cancer Maternal Grandmother    • COPD Maternal Grandfather        Social History     Socioeconomic History   • Marital status:      Spouse name: Not on file   • Number of children: Not on file   • Years of education: Not on file   • Highest education level: Not on file   Tobacco Use   • Smoking status: Never Smoker   • Smokeless tobacco: Never Used   Substance and Sexual Activity   • Alcohol use: No   • Drug use: No   • Sexual activity: Yes     Partners: Male     Birth control/protection: Surgical       I have reviewed the medical and surgical history, family history, social history, medications, and/or allergies, and the review of systems of this report.    No Known Allergies      Current Outpatient Medications:   •  albuterol sulfate  (90 Base) MCG/ACT inhaler, Inhale 2 puffs Every 4 (Four) Hours As Needed for Wheezing or Shortness of Air., Disp: 1 inhaler, Rfl: 11  •  busPIRone (BUSPAR) 5 MG tablet, Take 1-2 tablets by mouth every 8 hours as needed., Disp: 180 tablet, Rfl: 5  •  cetirizine (zyrTEC) 10 MG tablet, TAKE 1 TABLET BY MOUTH DAILY, Disp: 90 tablet, Rfl: 3  •  eletriptan (Relpax) 40 MG tablet, Take 1 tablet by mouth 1 (One) Time As Needed for Migraine for up to 1 dose. may repeat in 2 hours if necessary, Disp: 6 tablet, Rfl: 5  •  fluticasone-salmeterol (ADVAIR) 250-50 MCG/DOSE DISKUS, Inhale 1 puff 2 (Two) Times a Day., Disp: 60 each, Rfl: 5  •  HYDROcodone-acetaminophen (NORCO) 5-325 MG per tablet, Take 1 tablet by mouth Every 6 (Six) Hours As Needed for Moderate Pain ., Disp: 10 tablet, Rfl: 0  •  levothyroxine (Synthroid) 50 MCG  "tablet, Take 1 tablet by mouth Daily., Disp: 90 tablet, Rfl: 1  •  meloxicam (MOBIC) 15 MG tablet, TAKE 1 TABLET BY MOUTH DAILY, Disp: 90 tablet, Rfl: 0  •  metFORMIN ER (GLUCOPHAGE-XR) 500 MG 24 hr tablet, Take 1 tablet by mouth Daily With Breakfast., Disp: 90 tablet, Rfl: 1  •  montelukast (SINGULAIR) 10 MG tablet, Take 1 tablet by mouth Every Night., Disp: 30 tablet, Rfl: 5  •  nystatin (MYCOSTATIN) 358669 UNIT/GM ointment, Apply  topically to the appropriate area as directed 2 (Two) Times a Day As Needed (rash)., Disp: 30 g, Rfl: 0  •  ondansetron (ZOFRAN) 4 MG tablet, Take 1 tablet by mouth Every 6 (Six) Hours As Needed for Nausea., Disp: 16 tablet, Rfl: 0  •  promethazine (PHENERGAN) 25 MG tablet, Take 1 tablet by mouth Every 6 (Six) Hours As Needed for Nausea or Vomiting., Disp: 15 tablet, Rfl: 0  •  sertraline (ZOLOFT) 100 MG tablet, Take 1 tablet by mouth Daily., Disp: 90 tablet, Rfl: 1  •  traZODone (DESYREL) 50 MG tablet, TAKE 1/2 TO 1 TABLET BY MOUTH DAILY AT NIGHT 30 MINUTES BEFORE BEDTIME AS NEEDED FOR SLEEP, Disp: 90 tablet, Rfl: 3  •  trospium (SANCTURA) 20 MG tablet, TK 1 T PO  BID FOR INCONTINENCE, Disp: , Rfl:     Objective   Temp 96.8 °F (36 °C)   Ht 172.7 cm (68\")   Wt (!) 143 kg (315 lb)   BMI 47.90 kg/m²    Physical Exam  Constitutional: Patient is oriented to person, place, and time. Patient appears well-developed and well-nourished.   HENT:Head: Normocephalic and atraumatic.   Eyes: EOM are normal. Pupils are equal, round, and reactive to light.   Neck: Normal range of motion. Neck supple.   Cardiovascular: Normal rate.    Pulmonary/Chest: Effort normal and breath sounds normal.   Abdominal: Soft.   Neurological: Patient is alert and oriented to person, place, and time.   Skin: Skin is warm and dry.   Psychiatric: Patient has a normal mood and affect.   Nursing note and vitals reviewed.       [unfilled]   Lumbar spine: Positive straight leg raising with reproduction of left lower leg " pain with straight leg raise in the seated position, both knee and ankle reflexes are diminished no clonus pain is in a dermatomal distribution of the sciatic nerve of her left lower leg.    Left knee: Slight effusion full extension positive patellofemoral crepitus, negative Lachman negative Rc sign no medial lateral joint line pain calf supple no palpable Baker's cyst  Assessment/Plan   Review of Radiographic Studies:    No new imaging done today.  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 chondromalacia patella      Large Joint Arthrocentesis: L knee  Date/Time: 12/15/2020 11:57 AM  Consent given by: patient  Site marked: site marked  Timeout: Immediately prior to procedure a time out was called to verify the correct patient, procedure, equipment, support staff and site/side marked as required   Supporting Documentation  Indications: pain   Procedure Details  Location: knee - L knee  Preparation: Patient was prepped and draped in the usual sterile fashion  Needle size: 22 G  Approach: anterolateral  Medications administered: 40 mg triamcinolone acetonide 40 MG/ML; 2 mL lidocaine 1 %  Patient tolerance: patient tolerated the procedure well with no immediate complications           Diagnoses and all orders for this visit:    1. Chondromalacia of left knee (Primary)  -     Ambulatory Referral to Rheumatology    2. Sciatica of left side  -     Ambulatory Referral to Orthopedic Surgery    3. History of systemic lupus erythematosus (SLE) (CMS/[facility])  -     Ambulatory Referral to Rheumatology       Orthopedic activities reviewed and patient expressed appreciation, Risk, benefits, and merits of treatment alternatives reviewed with the patient and questions answered and Using illustrations and models, the nature of the pathology was explained to the patient      Recommendations/Plan:   Referral: Lumbar spine subspecialist and Rheumatologist    Patient agreeable to call or return sooner for  any concerns.             Impression:  History of systemic lupus, left anterior knee pain with chondromalacia patella confirmed on MRI, recent scan negative for DVT with left lower leg sciatica symptoms  Plan:  Refer to spine specialist for evaluation and treatment of her sciatica, refer to rheumatology for medical management of her lupus in conjunction with her knee chondromalacia

## 2020-12-17 ENCOUNTER — TELEPHONE (OUTPATIENT)
Dept: ORTHOPEDIC SURGERY | Facility: CLINIC | Age: 30
End: 2020-12-17

## 2020-12-17 NOTE — TELEPHONE ENCOUNTER
Provider: DR FANG  Caller: DR RAMIREZ OFFICE    Phone Number: [phone] ASK FOR [place]     Reason for Call: CALLING TO UPDATE REFERRAL.

## 2020-12-18 ENCOUNTER — TELEPHONE (OUTPATIENT)
Dept: INTERNAL MEDICINE | Facility: CLINIC | Age: 30
End: 2020-12-18

## 2020-12-18 NOTE — TELEPHONE ENCOUNTER
Sounds more like allergies but Covid cannot be excluded.  Please find out if she has more associated symptoms.

## 2020-12-18 NOTE — TELEPHONE ENCOUNTER
Patient is calling about some symptoms due to COVID, patient has a scratchy throat.  Patient is wondering if that may have to do  With COVID.  Patient would like a call back.. please advise      Delores Sprague call back [phone]

## 2020-12-21 ENCOUNTER — APPOINTMENT (OUTPATIENT)
Dept: MRI IMAGING | Facility: HOSPITAL | Age: 30
End: 2020-12-21

## 2021-01-06 ENCOUNTER — TRANSCRIBE ORDERS (OUTPATIENT)
Dept: ADMINISTRATIVE | Facility: HOSPITAL | Age: 31
End: 2021-01-06

## 2021-01-06 DIAGNOSIS — M54.50 LOW BACK PAIN, UNSPECIFIED BACK PAIN LATERALITY, UNSPECIFIED CHRONICITY, UNSPECIFIED WHETHER SCIATICA PRESENT: Primary | ICD-10-CM

## 2021-01-07 ENCOUNTER — OFFICE VISIT (OUTPATIENT)
Dept: INTERNAL MEDICINE | Facility: CLINIC | Age: 31
End: 2021-01-07

## 2021-01-07 VITALS
BODY MASS INDEX: 44.41 KG/M2 | HEART RATE: 78 BPM | OXYGEN SATURATION: 97 % | SYSTOLIC BLOOD PRESSURE: 134 MMHG | DIASTOLIC BLOOD PRESSURE: 94 MMHG | RESPIRATION RATE: 15 BRPM | HEIGHT: 68 IN | WEIGHT: 293 LBS | TEMPERATURE: 97.1 F

## 2021-01-07 DIAGNOSIS — N94.10 DYSPAREUNIA IN FEMALE: ICD-10-CM

## 2021-01-07 DIAGNOSIS — N93.9 ABNORMAL VAGINAL BLEEDING: Primary | ICD-10-CM

## 2021-01-07 PROCEDURE — 99214 OFFICE O/P EST MOD 30 MIN: CPT | Performed by: NURSE PRACTITIONER

## 2021-01-07 RX ORDER — DICLOFENAC SODIUM 75 MG/1
75 TABLET, DELAYED RELEASE ORAL 2 TIMES DAILY
COMMUNITY
Start: 2020-12-29 | End: 2021-02-08

## 2021-01-07 NOTE — PROGRESS NOTES
Chief Complaint / Reason:      Chief Complaint   Patient presents with   • Menorrhagia     since Dec 26th, brown and black and chunky with discharge. My mom had uterine cancer at 31 yo.        Subjective     HPI  Patient presents today with complaints of abnormal vaginal bleeding and states she has been bleeding since  and she has been having large clots and sometimes the blood looks brownish with discharge.  She states that her mother had uterine cancer at age 30.  She does have a history of PCOS.  Denies fever or chills.  She does report painful intercourse at times.  Denies any breast tenderness.  Denies any history of iron deficiency anemia.    History taken from: patient    PMH/FH/Social History were reviewed and updated appropriately in the electronic medical record.   Past Medical History:   Diagnosis Date   • Arthritis    • Asthma    • Depression    • Diverticulosis    • GERD (gastroesophageal reflux disease)    • Lupus (CMS/HCC)    • Seasonal allergies      Past Surgical History:   Procedure Laterality Date   •  SECTION     • GALLBLADDER SURGERY     • LAPAROSCOPIC CHOLECYSTECTOMY  2019   • SALPINGECTOMY Bilateral      Social History     Socioeconomic History   • Marital status:      Spouse name: Not on file   • Number of children: Not on file   • Years of education: Not on file   • Highest education level: Not on file   Tobacco Use   • Smoking status: Never Smoker   • Smokeless tobacco: Never Used   Substance and Sexual Activity   • Alcohol use: No   • Drug use: No   • Sexual activity: Yes     Partners: Male     Birth control/protection: Surgical     Family History   Problem Relation Age of Onset   • Diabetes Mother    • Hypertension Mother    • Hyperlipidemia Mother    • Breast cancer Mother         dx late 40s or early 50s   • Diabetes Father    • Mental illness Father    • Pancreatic cancer Paternal Grandfather    • Liver cancer Other    • Breast cancer Other    • Uterine  cancer Other    • Lung cancer Other    • Lung cancer Maternal Grandmother    • COPD Maternal Grandfather        Review of Systems:   Review of Systems   Constitutional: Negative.    Respiratory: Negative.    Cardiovascular: Negative.    Genitourinary: Positive for dyspareunia, menstrual problem and vaginal bleeding.   Neurological: Negative.    Psychiatric/Behavioral: Negative.          All other systems were reviewed and are negative.  Exceptions are noted in the subjective or above.      Objective     Vital Signs  Vitals:    01/07/21 1320   BP: 134/94   Pulse: 78   Resp: 15   Temp: 97.1 °F (36.2 °C)   SpO2: 97%       Body mass index is 46.83 kg/m².    Physical Exam  Vitals signs and nursing note reviewed. Exam conducted with a chaperone present.   Constitutional:       General: She is not in acute distress.     Appearance: She is well-developed.   HENT:      Head: Normocephalic and atraumatic.      Right Ear: Ear canal and external ear normal. Tympanic membrane is erythematous and bulging.      Left Ear: Ear canal and external ear normal. Tympanic membrane is not erythematous or bulging.      Nose: Mucosal edema present. No rhinorrhea.      Right Sinus: No maxillary sinus tenderness or frontal sinus tenderness.      Left Sinus: No maxillary sinus tenderness or frontal sinus tenderness.      Mouth/Throat:      Mouth: Mucous membranes are dry.      Pharynx: Posterior oropharyngeal erythema present.      Comments: PND    Eyes:      Conjunctiva/sclera: Conjunctivae normal.   Cardiovascular:      Rate and Rhythm: Normal rate and regular rhythm.      Heart sounds: Normal heart sounds.   Pulmonary:      Effort: Pulmonary effort is normal. No respiratory distress.      Breath sounds: Normal breath sounds.   Genitourinary:     Vagina: Vaginal discharge, erythema, tenderness and bleeding present.      Cervix: Discharge and cervical bleeding present.            Comments: Cervix positioned downward  Lymphadenopathy:       Head:      Right side of head: No submental, submandibular or tonsillar adenopathy.      Left side of head: No submental, submandibular or tonsillar adenopathy.      Cervical: No cervical adenopathy.   Skin:     General: Skin is warm and dry.      Capillary Refill: Capillary refill takes less than 2 seconds.      Findings: No rash.   Neurological:      Mental Status: She is alert and oriented to person, place, and time.   Psychiatric:         Behavior: Behavior normal.         Thought Content: Thought content normal.         Judgment: Judgment normal.              Results Review:    I reviewed the patient's previous clinical results.       Medication Review:     Current Outpatient Medications:   •  albuterol sulfate  (90 Base) MCG/ACT inhaler, Inhale 2 puffs Every 4 (Four) Hours As Needed for Wheezing or Shortness of Air., Disp: 1 inhaler, Rfl: 11  •  busPIRone (BUSPAR) 5 MG tablet, Take 1-2 tablets by mouth every 8 hours as needed., Disp: 180 tablet, Rfl: 5  •  cetirizine (zyrTEC) 10 MG tablet, TAKE 1 TABLET BY MOUTH DAILY, Disp: 90 tablet, Rfl: 3  •  diclofenac (VOLTAREN) 75 MG EC tablet, Take 75 mg by mouth 2 (Two) Times a Day., Disp: , Rfl:   •  fluticasone-salmeterol (ADVAIR) 250-50 MCG/DOSE DISKUS, Inhale 1 puff 2 (Two) Times a Day., Disp: 60 each, Rfl: 5  •  HYDROcodone-acetaminophen (NORCO) 5-325 MG per tablet, Take 1 tablet by mouth Every 6 (Six) Hours As Needed for Moderate Pain ., Disp: 10 tablet, Rfl: 0  •  levothyroxine (Synthroid) 50 MCG tablet, Take 1 tablet by mouth Daily., Disp: 90 tablet, Rfl: 1  •  sertraline (ZOLOFT) 100 MG tablet, Take 1 tablet by mouth Daily., Disp: 90 tablet, Rfl: 1  •  traZODone (DESYREL) 50 MG tablet, TAKE 1/2 TO 1 TABLET BY MOUTH DAILY AT NIGHT 30 MINUTES BEFORE BEDTIME AS NEEDED FOR SLEEP, Disp: 90 tablet, Rfl: 3  •  trospium (SANCTURA) 20 MG tablet, TK 1 T PO  BID FOR INCONTINENCE, Disp: , Rfl:     Diagnoses and all orders for this visit:    Abnormal vaginal  bleeding  -     Ambulatory Referral to Gynecology  -     US Non-ob Transvaginal  Discussed worsening signs and symptoms with patient along with nonpharmacological interventions.  Dyspareunia in female  -     US Non-ob Transvaginal    Other orders  -     diclofenac (VOLTAREN) 75 MG EC tablet; Take 75 mg by mouth 2 (Two) Times a Day.          Return if symptoms worsen or fail to improve.    Karla Oquendo, APRN  01/07/2021

## 2021-01-11 ENCOUNTER — TELEPHONE (OUTPATIENT)
Dept: INTERNAL MEDICINE | Facility: CLINIC | Age: 31
End: 2021-01-11

## 2021-01-11 ENCOUNTER — TELEPHONE (OUTPATIENT)
Dept: OBSTETRICS AND GYNECOLOGY | Facility: CLINIC | Age: 31
End: 2021-01-11

## 2021-01-11 RX ORDER — PROMETHAZINE HYDROCHLORIDE 12.5 MG/1
12.5 TABLET ORAL EVERY 8 HOURS PRN
Qty: 12 TABLET | Refills: 0 | Status: SHIPPED | OUTPATIENT
Start: 2021-01-11 | End: 2021-02-02

## 2021-01-11 RX ORDER — BACLOFEN 20 MG
1 TABLET ORAL DAILY
Qty: 30 TABLET | Refills: 3 | Status: SHIPPED | OUTPATIENT
Start: 2021-01-11 | End: 2021-02-02

## 2021-01-11 NOTE — TELEPHONE ENCOUNTER
PT  CALLED CONCERNED THAT HIS WIFE IS STILL BLEEDING, THROWING UP BLOOD, CAN NOT KEEP ANYHTING DOWN. COULD NOT GET INTO MRI SOONER THAN THE 1/25/21  PLEASE ADVISE

## 2021-01-11 NOTE — TELEPHONE ENCOUNTER
Spoke to , pt was vomiting yesterday, no blood in it, just the same symptoms as previous note on the vaginal bleeding

## 2021-01-11 NOTE — TELEPHONE ENCOUNTER
Patient would be a new patient, she's wanting to get in ASAP, she's been bleeding since dec.26, she said her mom had cancer at 30, she's just worried, I didn't know if you could get her in ASAP or not?

## 2021-01-12 ENCOUNTER — HOSPITAL ENCOUNTER (EMERGENCY)
Facility: HOSPITAL | Age: 31
Discharge: HOME | End: 2021-01-12
Attending: EMERGENCY MEDICINE | Admitting: EMERGENCY MEDICINE

## 2021-01-12 ENCOUNTER — APPOINTMENT (OUTPATIENT)
Dept: CT IMAGING | Facility: HOSPITAL | Age: 31
End: 2021-01-12

## 2021-01-12 VITALS
RESPIRATION RATE: 18 BRPM | HEIGHT: 68 IN | DIASTOLIC BLOOD PRESSURE: 86 MMHG | WEIGHT: 293 LBS | BODY MASS INDEX: 44.41 KG/M2 | OXYGEN SATURATION: 96 % | HEART RATE: 96 BPM | SYSTOLIC BLOOD PRESSURE: 134 MMHG | TEMPERATURE: 98 F

## 2021-01-12 DIAGNOSIS — N92.1 METRORRHAGIA: Primary | ICD-10-CM

## 2021-01-12 LAB
ALBUMIN SERPL-MCNC: 4.5 G/DL (ref 3.5–5.2)
ALBUMIN/GLOB SERPL: 1.3 G/DL
ALP SERPL-CCNC: 117 U/L (ref 39–117)
ALT SERPL W P-5'-P-CCNC: 28 U/L (ref 1–33)
ANION GAP SERPL CALCULATED.3IONS-SCNC: 12.2 MMOL/L (ref 5–15)
AST SERPL-CCNC: 21 U/L (ref 1–32)
BACTERIA UR QL AUTO: ABNORMAL /HPF
BASOPHILS # BLD AUTO: 0.06 10*3/MM3 (ref 0–0.2)
BASOPHILS NFR BLD AUTO: 0.4 % (ref 0–1.5)
BILIRUB SERPL-MCNC: 0.2 MG/DL (ref 0–1.2)
BILIRUB UR QL STRIP: NEGATIVE
BUN SERPL-MCNC: 13 MG/DL (ref 6–20)
BUN/CREAT SERPL: 15.5 (ref 7–25)
CALCIUM SPEC-SCNC: 9.5 MG/DL (ref 8.6–10.5)
CHLORIDE SERPL-SCNC: 99 MMOL/L (ref 98–107)
CLARITY UR: ABNORMAL
CO2 SERPL-SCNC: 27.8 MMOL/L (ref 22–29)
COLOR UR: YELLOW
CREAT SERPL-MCNC: 0.84 MG/DL (ref 0.57–1)
DEPRECATED RDW RBC AUTO: 41.8 FL (ref 37–54)
EOSINOPHIL # BLD AUTO: 0.47 10*3/MM3 (ref 0–0.4)
EOSINOPHIL NFR BLD AUTO: 3.5 % (ref 0.3–6.2)
ERYTHROCYTE [DISTWIDTH] IN BLOOD BY AUTOMATED COUNT: 13.6 % (ref 12.3–15.4)
GFR SERPL CREATININE-BSD FRML MDRD: 79 ML/MIN/1.73
GLOBULIN UR ELPH-MCNC: 3.5 GM/DL
GLUCOSE SERPL-MCNC: 98 MG/DL (ref 65–99)
GLUCOSE UR STRIP-MCNC: NEGATIVE MG/DL
HCG SERPL QL: NEGATIVE
HCT VFR BLD AUTO: 49 % (ref 34–46.6)
HGB BLD-MCNC: 16.1 G/DL (ref 12–15.9)
HGB UR QL STRIP.AUTO: ABNORMAL
HOLD SPECIMEN: NORMAL
HYALINE CASTS UR QL AUTO: ABNORMAL /LPF
IMM GRANULOCYTES # BLD AUTO: 0.04 10*3/MM3 (ref 0–0.05)
IMM GRANULOCYTES NFR BLD AUTO: 0.3 % (ref 0–0.5)
KETONES UR QL STRIP: NEGATIVE
LEUKOCYTE ESTERASE UR QL STRIP.AUTO: NEGATIVE
LYMPHOCYTES # BLD AUTO: 4.73 10*3/MM3 (ref 0.7–3.1)
LYMPHOCYTES NFR BLD AUTO: 35.1 % (ref 19.6–45.3)
MCH RBC QN AUTO: 27.5 PG (ref 26.6–33)
MCHC RBC AUTO-ENTMCNC: 32.9 G/DL (ref 31.5–35.7)
MCV RBC AUTO: 83.8 FL (ref 79–97)
MONOCYTES # BLD AUTO: 0.66 10*3/MM3 (ref 0.1–0.9)
MONOCYTES NFR BLD AUTO: 4.9 % (ref 5–12)
NEUTROPHILS NFR BLD AUTO: 55.8 % (ref 42.7–76)
NEUTROPHILS NFR BLD AUTO: 7.52 10*3/MM3 (ref 1.7–7)
NITRITE UR QL STRIP: NEGATIVE
NRBC BLD AUTO-RTO: 0 /100 WBC (ref 0–0.2)
PH UR STRIP.AUTO: 7 [PH] (ref 5–8)
PLATELET # BLD AUTO: 419 10*3/MM3 (ref 140–450)
PMV BLD AUTO: 9.5 FL (ref 6–12)
POTASSIUM SERPL-SCNC: 4 MMOL/L (ref 3.5–5.2)
PROT SERPL-MCNC: 8 G/DL (ref 6–8.5)
PROT UR QL STRIP: NEGATIVE
RBC # BLD AUTO: 5.85 10*6/MM3 (ref 3.77–5.28)
RBC # UR: ABNORMAL /HPF
REF LAB TEST METHOD: ABNORMAL
SODIUM SERPL-SCNC: 139 MMOL/L (ref 136–145)
SP GR UR STRIP: 1.02 (ref 1–1.03)
SQUAMOUS #/AREA URNS HPF: ABNORMAL /HPF
UROBILINOGEN UR QL STRIP: ABNORMAL
WBC # BLD AUTO: 13.48 10*3/MM3 (ref 3.4–10.8)
WBC UR QL AUTO: ABNORMAL /HPF
WHOLE BLOOD HOLD SPECIMEN: NORMAL
WHOLE BLOOD HOLD SPECIMEN: NORMAL

## 2021-01-12 PROCEDURE — 99283 EMERGENCY DEPT VISIT LOW MDM: CPT

## 2021-01-12 PROCEDURE — 74177 CT ABD & PELVIS W/CONTRAST: CPT

## 2021-01-12 PROCEDURE — 25010000002 DROPERIDOL PER 5 MG: Performed by: PHYSICIAN ASSISTANT

## 2021-01-12 PROCEDURE — 81001 URINALYSIS AUTO W/SCOPE: CPT

## 2021-01-12 PROCEDURE — 96374 THER/PROPH/DIAG INJ IV PUSH: CPT

## 2021-01-12 PROCEDURE — 25010000002 IOPAMIDOL 61 % SOLUTION

## 2021-01-12 PROCEDURE — 85025 COMPLETE CBC W/AUTO DIFF WBC: CPT

## 2021-01-12 PROCEDURE — 84703 CHORIONIC GONADOTROPIN ASSAY: CPT

## 2021-01-12 PROCEDURE — 93005 ELECTROCARDIOGRAM TRACING: CPT

## 2021-01-12 PROCEDURE — 25010000002 IOPAMIDOL 61 % SOLUTION: Performed by: EMERGENCY MEDICINE

## 2021-01-12 PROCEDURE — 96361 HYDRATE IV INFUSION ADD-ON: CPT

## 2021-01-12 PROCEDURE — 80053 COMPREHEN METABOLIC PANEL: CPT

## 2021-01-12 RX ORDER — SODIUM CHLORIDE 0.9 % (FLUSH) 0.9 %
10 SYRINGE (ML) INJECTION AS NEEDED
Status: DISCONTINUED | OUTPATIENT
Start: 2021-01-12 | End: 2021-01-12 | Stop reason: HOSPADM

## 2021-01-12 RX ORDER — ONDANSETRON 2 MG/ML
4 INJECTION INTRAMUSCULAR; INTRAVENOUS ONCE
Status: DISCONTINUED | OUTPATIENT
Start: 2021-01-12 | End: 2021-01-12

## 2021-01-12 RX ORDER — MEDROXYPROGESTERONE ACETATE 10 MG/1
10 TABLET ORAL DAILY
Qty: 10 TABLET | Refills: 0 | Status: SHIPPED | OUTPATIENT
Start: 2021-01-12 | End: 2021-01-19 | Stop reason: SDUPTHER

## 2021-01-12 RX ORDER — DROPERIDOL 2.5 MG/ML
1.25 INJECTION, SOLUTION INTRAMUSCULAR; INTRAVENOUS ONCE
Status: COMPLETED | OUTPATIENT
Start: 2021-01-12 | End: 2021-01-12

## 2021-01-12 RX ADMIN — IOPAMIDOL: 612 INJECTION, SOLUTION INTRAVENOUS at 16:33

## 2021-01-12 RX ADMIN — DROPERIDOL 1.25 MG: 2.5 INJECTION, SOLUTION INTRAMUSCULAR; INTRAVENOUS at 16:01

## 2021-01-12 RX ADMIN — IOPAMIDOL 100 ML: 612 INJECTION, SOLUTION INTRAVENOUS at 16:16

## 2021-01-12 RX ADMIN — SODIUM CHLORIDE 1000 ML: 9 INJECTION, SOLUTION INTRAVENOUS at 15:10

## 2021-01-12 NOTE — ED PROVIDER NOTES
Subjective   30-year-old female presents with heavy bleeding, she has been having heavy bleeding since , she has an appointment on the  with her OB/GYN, she is very concerned because her mother had gynecological cancer and had irregular bleeding at an early age.  She does have a history of PCOS.  She also states that she feels weak and dizzy, she is concerned it is because of the bleeding.  She also reports that she has had nausea vomiting for the last several days.      History provided by:  Patient   used: No        Review of Systems   Gastrointestinal: Positive for nausea and vomiting.   Genitourinary: Positive for menstrual problem.   All other systems reviewed and are negative.      Past Medical History:   Diagnosis Date   • Arthritis    • Asthma    • Depression    • Diverticulosis    • GERD (gastroesophageal reflux disease)    • Lupus (CMS/HCC)    • Seasonal allergies        Allergies   Allergen Reactions   • Ondansetron Headache     Pt states gives her migraines       Past Surgical History:   Procedure Laterality Date   •  SECTION     • GALLBLADDER SURGERY     • LAPAROSCOPIC CHOLECYSTECTOMY  2019   • SALPINGECTOMY Bilateral        Family History   Problem Relation Age of Onset   • Diabetes Mother    • Hypertension Mother    • Hyperlipidemia Mother    • Breast cancer Mother         dx late 40s or early 50s   • Diabetes Father    • Mental illness Father    • Pancreatic cancer Paternal Grandfather    • Liver cancer Other    • Breast cancer Other    • Uterine cancer Other    • Lung cancer Other    • Lung cancer Maternal Grandmother    • COPD Maternal Grandfather        Social History     Socioeconomic History   • Marital status:      Spouse name: Not on file   • Number of children: Not on file   • Years of education: Not on file   • Highest education level: Not on file   Tobacco Use   • Smoking status: Never Smoker   • Smokeless tobacco: Never Used   Substance  and Sexual Activity   • Alcohol use: No   • Drug use: No   • Sexual activity: Yes     Partners: Male     Birth control/protection: Surgical           Objective   Physical Exam  Vitals signs and nursing note reviewed.   Constitutional:       Appearance: She is well-developed.   Neck:      Musculoskeletal: Normal range of motion and neck supple.   Cardiovascular:      Rate and Rhythm: Normal rate and regular rhythm.   Pulmonary:      Effort: Pulmonary effort is normal.      Breath sounds: Normal breath sounds.   Abdominal:      General: Bowel sounds are normal.      Palpations: Abdomen is soft.   Musculoskeletal: Normal range of motion.   Skin:     General: Skin is warm and dry.   Neurological:      Mental Status: She is alert and oriented to person, place, and time.      Deep Tendon Reflexes: Reflexes are normal and symmetric.         Procedures           ED Course  ED Course as of Jan 12 1632   Tue Jan 12, 2021   1525 EKG interpreted by me reveals sinus rhythm rate 95 nonspecific diffuse T wave changes.    [PF]   1551 Discussed the case with Dr. Duke, she recommended Provera and to the patient follows up with her scheduled appointment, she is complaining of vomiting with everything that she eats, so she will get a CT scan of her abdomen pelvis before she is dispositioned    [CS]      ED Course User Index  [CS] Filipe Hoskins Jr., PA-C  [PF] Jose Villaseñor, DO                                           MDM  Number of Diagnoses or Management Options  Metrorrhagia: new and requires workup     Amount and/or Complexity of Data Reviewed  Clinical lab tests: reviewed  Tests in the radiology section of CPT®: reviewed    Risk of Complications, Morbidity, and/or Mortality  Presenting problems: minimal  Diagnostic procedures: minimal  Management options: minimal    Patient Progress  Patient progress: stable      Final diagnoses:   Metrorrhagia            Filipe Hoskins Jr., PA-C  01/12/21 1632

## 2021-01-14 ENCOUNTER — HOSPITAL ENCOUNTER (OUTPATIENT)
Dept: ULTRASOUND IMAGING | Facility: HOSPITAL | Age: 31
Discharge: HOME | End: 2021-01-14
Admitting: NURSE PRACTITIONER

## 2021-01-14 PROCEDURE — 76830 TRANSVAGINAL US NON-OB: CPT

## 2021-01-19 ENCOUNTER — OFFICE VISIT (OUTPATIENT)
Dept: OBSTETRICS AND GYNECOLOGY | Facility: CLINIC | Age: 31
End: 2021-01-19

## 2021-01-19 VITALS
SYSTOLIC BLOOD PRESSURE: 124 MMHG | WEIGHT: 293 LBS | BODY MASS INDEX: 44.41 KG/M2 | HEIGHT: 68 IN | DIASTOLIC BLOOD PRESSURE: 82 MMHG

## 2021-01-19 DIAGNOSIS — E03.9 HYPOTHYROIDISM, UNSPECIFIED TYPE: ICD-10-CM

## 2021-01-19 DIAGNOSIS — N94.10 FEMALE DYSPAREUNIA: ICD-10-CM

## 2021-01-19 DIAGNOSIS — Z13.1 SCREENING FOR DIABETES MELLITUS: ICD-10-CM

## 2021-01-19 DIAGNOSIS — R10.2 PELVIC CRAMPING: ICD-10-CM

## 2021-01-19 DIAGNOSIS — N93.9 ABNORMAL UTERINE BLEEDING (AUB): Primary | ICD-10-CM

## 2021-01-19 DIAGNOSIS — E28.2 PCOS (POLYCYSTIC OVARIAN SYNDROME): ICD-10-CM

## 2021-01-19 PROCEDURE — 99204 OFFICE O/P NEW MOD 45 MIN: CPT | Performed by: OBSTETRICS & GYNECOLOGY

## 2021-01-19 RX ORDER — MEDROXYPROGESTERONE ACETATE 10 MG/1
TABLET ORAL
Qty: 20 TABLET | Refills: 5 | Status: SHIPPED | OUTPATIENT
Start: 2021-01-19 | End: 2021-02-02

## 2021-01-19 NOTE — PROGRESS NOTES
GYN Office Visit    Subjective   Chief Complaint   Patient presents with   • Vaginal Bleeding     Started bleeding on 20 and has not stopped, heavy with clots, cramping.     Delores Sprague is a 30 y.o. year old  presenting to be seen for heavy, prolonged vaginal bleeding.    She reports heavy and prolonged vaginal bleeding that started the day after Ronni.  Passage of large clots.  Significant cramping.  Pain with intercourse also present.  She was seen recently in the emergency room and had a reassuring CT and ultrasound.  Blood work was reassuring there as well, though she did have mild leukocytosis at the time.  She reports very irregular menses.  She will frequently skip 3 months in between bleeding episodes.  When she does have bleeding, it is typically very heavy and lasts for days.  She often will change maxi pad and tampon hourly when bleeding is at its worst.  She does have significant pain with bleeding episodes.  She was not previously taking hormonal medication, but she was started on Provera in the emergency room which she has continued until today.  She is out of that medication now.  Previous salpingectomy for birth control.  Previous history of hypothyroidism and PCOS.  She also reports a history of lupus, but her MARY was negative 2019.  Problems with weight gain.  Unable to lose weight even with diet.  No current PCOS treatments.    OB Hx: 1 vaginal birth, 1   Pap smear: 2021  Mammogram: Never  Colonoscopy: Never  DEXA Scan: Never    Past Medical History:   Diagnosis Date   • Arthritis    • Asthma    • Depression    • Disease of thyroid gland    • Diverticulosis    • Female infertility    • GERD (gastroesophageal reflux disease)    • Gestational hypertension    • Lupus (CMS/HCC)    • Polycystic ovary syndrome    • Seasonal allergies        Past Surgical History:   Procedure Laterality Date   •  SECTION     • GALLBLADDER SURGERY     • LAPAROSCOPIC  CHOLECYSTECTOMY  04/2019   • SALPINGECTOMY Bilateral        Family History   Problem Relation Age of Onset   • Diabetes Mother    • Hypertension Mother    • Hyperlipidemia Mother    • Breast cancer Mother         dx late 40s or early 50s   • Uterine cancer Mother    • Diabetes Father    • Mental illness Father    • Pancreatic cancer Paternal Grandfather    • Liver cancer Other    • Breast cancer Other    • Uterine cancer Other    • Lung cancer Other    • Lung cancer Maternal Grandmother    • COPD Maternal Grandfather         Social History     Tobacco Use   • Smoking status: Never Smoker   • Smokeless tobacco: Never Used   Substance Use Topics   • Alcohol use: No   • Drug use: No       (Not in a hospital admission)      Ondansetron    Current Outpatient Medications on File Prior to Visit   Medication Sig Dispense Refill   • albuterol sulfate  (90 Base) MCG/ACT inhaler Inhale 2 puffs Every 4 (Four) Hours As Needed for Wheezing or Shortness of Air. 1 inhaler 11   • busPIRone (BUSPAR) 5 MG tablet Take 1-2 tablets by mouth every 8 hours as needed. 180 tablet 5   • cetirizine (zyrTEC) 10 MG tablet TAKE 1 TABLET BY MOUTH DAILY 90 tablet 3   • fluticasone-salmeterol (ADVAIR) 250-50 MCG/DOSE DISKUS Inhale 1 puff 2 (Two) Times a Day. 60 each 5   • levothyroxine (Synthroid) 50 MCG tablet Take 1 tablet by mouth Daily. 90 tablet 1   • Magnesium Oxide 500 MG tablet Take 1 tablet by mouth Daily. 30 tablet 3   • sertraline (ZOLOFT) 100 MG tablet Take 1 tablet by mouth Daily. 90 tablet 1   • traZODone (DESYREL) 50 MG tablet TAKE 1/2 TO 1 TABLET BY MOUTH DAILY AT NIGHT 30 MINUTES BEFORE BEDTIME AS NEEDED FOR SLEEP 90 tablet 3   • trospium (SANCTURA) 20 MG tablet TK 1 T PO  BID FOR INCONTINENCE     • [DISCONTINUED] medroxyPROGESTERone (Provera) 10 MG tablet Take 1 tablet by mouth Daily for 10 days. 10 tablet 0   • diclofenac (VOLTAREN) 75 MG EC tablet Take 75 mg by mouth 2 (Two) Times a Day.     • promethazine (PHENERGAN) 12.5  "MG tablet Take 1 tablet by mouth Every 8 (Eight) Hours As Needed for Nausea or Vomiting. 12 tablet 0   • [DISCONTINUED] HYDROcodone-acetaminophen (NORCO) 5-325 MG per tablet Take 1 tablet by mouth Every 6 (Six) Hours As Needed for Moderate Pain . 10 tablet 0     No current facility-administered medications on file prior to visit.        Social History    Tobacco Use      Smoking status: Never Smoker      Smokeless tobacco: Never Used         Objective   /82   Ht 172.7 cm (68\")   Wt (!) 140 kg (309 lb)   LMP 12/26/2020   BMI 46.98 kg/m²     Physical Exam:  General Appearance: alert, pleasant, appears stated age, interactive and cooperative  Breasts: Not performed.  Abdomen: no masses, no hepatomegaly, no splenomegaly, soft non-tender, no guarding and no rebound tenderness  Pelvis:  Pelvic: Clinical staff was present for exam  External genitalia:  normal appearance of the external genitalia including Bartholin's and Eolia's glands.  :  urethral meatus normal;  Vagina:  normal pink mucosa without prolapse or lesions.  Cervix:  normal appearance.  Uterus:  normal size, shape and consistency.  Adnexa:  normal bimanual exam of the adnexa.  Rectal:  digital rectal exam not performed; anus visually normal appearing.         Medical Decision Making:    I reviewed her MARY from August 2019, emergency room labs on 1/12/2021, CT scan and pelvic ultrasound.    Assessment   Abnormal uterine bleeding  Menorrhagia with irregular cycle  Pelvic cramping and dyspareunia  History of PCOS  Hypothyroidism  Morbid obesity     Plan    Orders Placed This Encounter   Procedures   • NuSwab VG+ - Swab, Cervix   • Hemoglobin A1c   • TSH   • HCG, Serum, Qualitative   • CBC & Differential     Order Specific Question:   Manual Differential     Answer:   No       Medication Management: Continue Provera 10 mg daily for 2 weeks, then stop.  Standing order for Provera 10 mg daily x10 days to produce withdrawal bleeds.    Procedures " Performed: none    We reviewed her symptoms, exam findings, recent blood work and imaging in detail today.  Vaginal cultures and blood work were obtained today as above.  We reviewed various medical and surgical treatment options to address her pain and bleeding symptoms.  We discussed PCOS in detail.  We discussed weight, diet and exercise.  After reviewing these options, the patient desires to continue with Provera for now.  Orders placed for the medication as detailed above.  She is also giving consideration to an IUD for long-term maintenance therapy and protection of the endometrium.  All questions answered.  She will notify our clinic if she desires to move forward with the IUD.  She is moving to [place] in the very near future.  Her  moves frequently for the .  They just built a house in [place].    RTC as needed    Gabo Turner MD  Obstetrics and Gynecology  [facility]

## 2021-01-20 LAB
B-HCG SERPL QL: NEGATIVE
BASOPHILS # BLD AUTO: 0.08 10*3/MM3 (ref 0–0.2)
BASOPHILS NFR BLD AUTO: 0.6 % (ref 0–1.5)
EOSINOPHIL # BLD AUTO: 0.45 10*3/MM3 (ref 0–0.4)
EOSINOPHIL NFR BLD AUTO: 3.6 % (ref 0.3–6.2)
ERYTHROCYTE [DISTWIDTH] IN BLOOD BY AUTOMATED COUNT: 14 % (ref 12.3–15.4)
HBA1C MFR BLD: 5.6 % (ref 4.8–5.6)
HCT VFR BLD AUTO: 46.5 % (ref 34–46.6)
HGB BLD-MCNC: 15.8 G/DL (ref 12–15.9)
IMM GRANULOCYTES # BLD AUTO: 0.07 10*3/MM3 (ref 0–0.05)
IMM GRANULOCYTES NFR BLD AUTO: 0.6 % (ref 0–0.5)
LYMPHOCYTES # BLD AUTO: 3.94 10*3/MM3 (ref 0.7–3.1)
LYMPHOCYTES NFR BLD AUTO: 31.9 % (ref 19.6–45.3)
MCH RBC QN AUTO: 28 PG (ref 26.6–33)
MCHC RBC AUTO-ENTMCNC: 34 G/DL (ref 31.5–35.7)
MCV RBC AUTO: 82.4 FL (ref 79–97)
MONOCYTES # BLD AUTO: 0.76 10*3/MM3 (ref 0.1–0.9)
MONOCYTES NFR BLD AUTO: 6.1 % (ref 5–12)
NEUTROPHILS # BLD AUTO: 7.07 10*3/MM3 (ref 1.7–7)
NEUTROPHILS NFR BLD AUTO: 57.2 % (ref 42.7–76)
NRBC BLD AUTO-RTO: 0 /100 WBC (ref 0–0.2)
PLATELET # BLD AUTO: 416 10*3/MM3 (ref 140–450)
RBC # BLD AUTO: 5.64 10*6/MM3 (ref 3.77–5.28)
TSH SERPL DL<=0.005 MIU/L-ACNC: 2.21 UIU/ML (ref 0.27–4.2)
WBC # BLD AUTO: 12.37 10*3/MM3 (ref 3.4–10.8)

## 2021-01-21 ENCOUNTER — TELEPHONE (OUTPATIENT)
Dept: OBSTETRICS AND GYNECOLOGY | Facility: CLINIC | Age: 31
End: 2021-01-21

## 2021-01-21 LAB
A VAGINAE DNA VAG QL NAA+PROBE: NORMAL SCORE
BVAB2 DNA VAG QL NAA+PROBE: NORMAL SCORE
C ALBICANS DNA VAG QL NAA+PROBE: NEGATIVE
C GLABRATA DNA VAG QL NAA+PROBE: NEGATIVE
C TRACH DNA VAG QL NAA+PROBE: NEGATIVE
MEGA1 DNA VAG QL NAA+PROBE: NORMAL SCORE
N GONORRHOEA DNA VAG QL NAA+PROBE: NEGATIVE
T VAGINALIS DNA VAG QL NAA+PROBE: NEGATIVE

## 2021-01-21 NOTE — TELEPHONE ENCOUNTER
----- Message from Delores Sprague sent at 1/21/2021  8:48 AM EST -----  Regarding: Visit Follow-Up Question  Contact: [phone]  Even on the pill, this is still happening. The cramps are ridiculous, I am assuming do yo the bleeding.

## 2021-01-21 NOTE — TELEPHONE ENCOUNTER
This message was sent through portal from patient still having bleeding and cramping, does she need to be seen?

## 2021-01-22 NOTE — TELEPHONE ENCOUNTER
Spoke with patient and informed, she advised unable to take OCP due to side effects, is going to stop pills at this time and scheduled appointment Wednesday with you to further discuss options.

## 2021-01-22 NOTE — TELEPHONE ENCOUNTER
She can stop the pill and see if that helps. We could also try an OCP taper. She can be seen if she desires, but it will probably be with someone else unless she wants to wait until next Wednesday PM.

## 2021-01-25 ENCOUNTER — HOSPITAL ENCOUNTER (OUTPATIENT)
Dept: MRI IMAGING | Facility: HOSPITAL | Age: 31
Discharge: HOME | End: 2021-01-25
Admitting: ORTHOPAEDIC SURGERY

## 2021-01-25 DIAGNOSIS — M54.50 LOW BACK PAIN, UNSPECIFIED BACK PAIN LATERALITY, UNSPECIFIED CHRONICITY, UNSPECIFIED WHETHER SCIATICA PRESENT: ICD-10-CM

## 2021-01-25 PROCEDURE — 72148 MRI LUMBAR SPINE W/O DYE: CPT

## 2021-01-28 ENCOUNTER — OFFICE VISIT (OUTPATIENT)
Dept: OBSTETRICS AND GYNECOLOGY | Facility: CLINIC | Age: 31
End: 2021-01-28

## 2021-01-28 ENCOUNTER — PREP FOR SURGERY (OUTPATIENT)
Dept: OTHER | Facility: HOSPITAL | Age: 31
End: 2021-01-28

## 2021-01-28 VITALS
DIASTOLIC BLOOD PRESSURE: 84 MMHG | BODY MASS INDEX: 44.41 KG/M2 | WEIGHT: 293 LBS | SYSTOLIC BLOOD PRESSURE: 132 MMHG | HEIGHT: 68 IN

## 2021-01-28 DIAGNOSIS — N93.9 ABNORMAL UTERINE BLEEDING: Primary | ICD-10-CM

## 2021-01-28 DIAGNOSIS — N93.9 ABNORMAL UTERINE BLEEDING (AUB): Primary | ICD-10-CM

## 2021-01-28 PROCEDURE — 99213 OFFICE O/P EST LOW 20 MIN: CPT | Performed by: PHYSICIAN ASSISTANT

## 2021-01-28 RX ORDER — SODIUM CHLORIDE 0.9 % (FLUSH) 0.9 %
3 SYRINGE (ML) INJECTION EVERY 12 HOURS SCHEDULED
Status: CANCELLED | OUTPATIENT
Start: 2021-01-28

## 2021-01-28 RX ORDER — SODIUM CHLORIDE 0.9 % (FLUSH) 0.9 %
10 SYRINGE (ML) INJECTION AS NEEDED
Status: CANCELLED | OUTPATIENT
Start: 2021-01-28

## 2021-01-28 RX ORDER — MEDROXYPROGESTERONE ACETATE 10 MG/1
TABLET ORAL
Qty: 20 TABLET | Refills: 0 | Status: SHIPPED | OUTPATIENT
Start: 2021-01-28 | End: 2021-02-02

## 2021-01-28 NOTE — PROGRESS NOTES
Subjective   Chief Complaint   Patient presents with   • Vaginal Bleeding     Patient states that she has been bleeding since 20.  Was put on Provera to stop the bleeding but is still bleeding.  Recently stopped the progesterone.       Delores Sprague is a 30 y.o. year old  presenting to be seen for continued abnormal bleeding.  Patient has been bleeding now since .  She has a history of polycystic ovary syndrome and history of oligomenorrhea and amenorrhea.  She is also concerned because she has a family history of uterine cancer in her mother and grandmother.  Patient had initially seen her PCP provider when her abnormal bleeding first started and then has also had a visit in the emergency room.  She was placed on Provera 10 mg which she took for 14 days and states that she finished this about 1 week ago.  She reports that she continued bleeding on the Provera and did not see her bleeding lighten up any with Provera.  Since she finished the Provera a week ago her bleeding has remained fairly constant with clots and chunks of dark brown tissue.  She is cramping with the bleeding.   She has had a tubal ligation.  She had a transvaginal ultrasound in the office 2 weeks ago which noted her endometrium to be 17 mm but ultrasound was otherwise normal.  CBC 1 week ago noted normal hemoglobin and hematocrit  She had normal pap 2021    Past Medical History:   Diagnosis Date   • Arthritis    • Asthma    • Depression    • Disease of thyroid gland    • Diverticulosis    • Female infertility    • GERD (gastroesophageal reflux disease)    • Gestational hypertension    • Lupus (CMS/HCC)    • Polycystic ovary syndrome    • Seasonal allergies         Current Outpatient Medications:   •  albuterol sulfate  (90 Base) MCG/ACT inhaler, Inhale 2 puffs Every 4 (Four) Hours As Needed for Wheezing or Shortness of Air., Disp: 1 inhaler, Rfl: 11  •  busPIRone (BUSPAR) 5 MG tablet, Take 1-2 tablets by  mouth every 8 hours as needed., Disp: 180 tablet, Rfl: 5  •  cetirizine (zyrTEC) 10 MG tablet, TAKE 1 TABLET BY MOUTH DAILY, Disp: 90 tablet, Rfl: 3  •  fluticasone-salmeterol (ADVAIR) 250-50 MCG/DOSE DISKUS, Inhale 1 puff 2 (Two) Times a Day., Disp: 60 each, Rfl: 5  •  levothyroxine (Synthroid) 50 MCG tablet, Take 1 tablet by mouth Daily., Disp: 90 tablet, Rfl: 1  •  sertraline (ZOLOFT) 100 MG tablet, Take 1 tablet by mouth Daily., Disp: 90 tablet, Rfl: 1  •  traZODone (DESYREL) 50 MG tablet, TAKE 1/2 TO 1 TABLET BY MOUTH DAILY AT NIGHT 30 MINUTES BEFORE BEDTIME AS NEEDED FOR SLEEP, Disp: 90 tablet, Rfl: 3  •  trospium (SANCTURA) 20 MG tablet, TK 1 T PO  BID FOR INCONTINENCE, Disp: , Rfl:   •  diclofenac (VOLTAREN) 75 MG EC tablet, Take 75 mg by mouth 2 (Two) Times a Day., Disp: , Rfl:   •  Magnesium Oxide 500 MG tablet, Take 1 tablet by mouth Daily., Disp: 30 tablet, Rfl: 3  •  medroxyPROGESTERone (Provera) 10 MG tablet, Take 1 tablet daily for 2 weeks, then stop. Take 1 tablet daily for 10 days monthly as needed to produce bleeding thereafter., Disp: 20 tablet, Rfl: 5  •  medroxyPROGESTERone (Provera) 10 MG tablet, Take 2 tablets po daily for 10 days, Disp: 20 tablet, Rfl: 0  •  promethazine (PHENERGAN) 12.5 MG tablet, Take 1 tablet by mouth Every 8 (Eight) Hours As Needed for Nausea or Vomiting., Disp: 12 tablet, Rfl: 0   Allergies   Allergen Reactions   • Ondansetron Headache     Pt states gives her migraines      Past Surgical History:   Procedure Laterality Date   •  SECTION     • GALLBLADDER SURGERY     • LAPAROSCOPIC CHOLECYSTECTOMY  2019   • SALPINGECTOMY Bilateral       Social History     Socioeconomic History   • Marital status:      Spouse name: Not on file   • Number of children: Not on file   • Years of education: Not on file   • Highest education level: Not on file   Tobacco Use   • Smoking status: Never Smoker   • Smokeless tobacco: Never Used   Substance and Sexual Activity   •  "Alcohol use: No   • Drug use: No   • Sexual activity: Yes     Partners: Male     Birth control/protection: Surgical      Family History   Problem Relation Age of Onset   • Diabetes Mother    • Hypertension Mother    • Hyperlipidemia Mother    • Breast cancer Mother         dx late 40s or early 50s   • Uterine cancer Mother    • Diabetes Father    • Mental illness Father    • Pancreatic cancer Paternal Grandfather    • Liver cancer Other    • Breast cancer Other    • Uterine cancer Other    • Lung cancer Other    • Lung cancer Maternal Grandmother    • COPD Maternal Grandfather        Review of Systems   Constitutional: Negative for chills, fatigue and fever.   Gastrointestinal: Negative for abdominal pain, constipation, diarrhea, nausea and vomiting.   Genitourinary: Positive for menstrual problem and vaginal bleeding.           Objective   /84   Ht 172.7 cm (68\")   Wt (!) 140 kg (308 lb 9.6 oz)   LMP 12/26/2020 (Exact Date)   Breastfeeding No   BMI 46.92 kg/m²     Physical Exam  Constitutional:       General: She is not in acute distress.     Appearance: She is obese.   Genitourinary:     Comments: deferred  Skin:     General: Skin is warm and dry.      Findings: No lesion or rash.   Neurological:      General: No focal deficit present.      Mental Status: She is alert and oriented to person, place, and time.   Psychiatric:         Attention and Perception: Attention normal.         Mood and Affect: Mood normal.         Speech: Speech normal.         Behavior: Behavior is cooperative.         Thought Content: Thought content normal.              Assessment and Plan  Diagnoses and all orders for this visit:    1. Abnormal uterine bleeding (AUB) (Primary)    Other orders  -     medroxyPROGESTERone (Provera) 10 MG tablet; Take 2 tablets po daily for 10 days  Dispense: 20 tablet; Refill: 0      Patient Instructions   Patient is counseled regarding recommendation for endometrial sampling.  She is offered the " option of endometrial biopsy in the office today versus outpatient D&C.  She is counseled regarding the risks and benefits of each procedure.  Patient desires to proceed with D&C hysteroscopy for the most thorough evaluation.  We will start her on Provera 20 mg in an attempt to lighten up her bleeding prior to the D&C.             This note was electronically signed.    Janie Stack PA-C   January 28, 2021

## 2021-01-28 NOTE — PATIENT INSTRUCTIONS
Patient is counseled regarding recommendation for endometrial sampling.  She is offered the option of endometrial biopsy in the office today versus outpatient D&C.  She is counseled regarding the risks and benefits of each procedure.  Patient desires to proceed with D&C hysteroscopy for the most thorough evaluation.  We will start her on Provera 20 mg in an attempt to lighten up her bleeding prior to the D&C.

## 2021-02-02 ENCOUNTER — APPOINTMENT (OUTPATIENT)
Dept: PREADMISSION TESTING | Facility: HOSPITAL | Age: 31
End: 2021-02-02

## 2021-02-02 VITALS — BODY MASS INDEX: 44.41 KG/M2 | WEIGHT: 293 LBS | HEIGHT: 68 IN

## 2021-02-02 DIAGNOSIS — N93.9 ABNORMAL UTERINE BLEEDING: ICD-10-CM

## 2021-02-02 LAB
ANION GAP SERPL CALCULATED.3IONS-SCNC: 10.1 MMOL/L (ref 5–15)
B-HCG UR QL: NEGATIVE
BACTERIA UR QL AUTO: ABNORMAL /HPF
BASOPHILS # BLD AUTO: 0.04 10*3/MM3 (ref 0–0.2)
BASOPHILS NFR BLD AUTO: 0.4 % (ref 0–1.5)
BILIRUB UR QL STRIP: NEGATIVE
BUN SERPL-MCNC: 15 MG/DL (ref 6–20)
BUN/CREAT SERPL: 19 (ref 7–25)
CALCIUM SPEC-SCNC: 9 MG/DL (ref 8.6–10.5)
CHLORIDE SERPL-SCNC: 102 MMOL/L (ref 98–107)
CLARITY UR: CLEAR
CO2 SERPL-SCNC: 25.9 MMOL/L (ref 22–29)
COLOR UR: YELLOW
CREAT SERPL-MCNC: 0.79 MG/DL (ref 0.57–1)
DEPRECATED RDW RBC AUTO: 41.9 FL (ref 37–54)
EOSINOPHIL # BLD AUTO: 0.34 10*3/MM3 (ref 0–0.4)
EOSINOPHIL NFR BLD AUTO: 3.2 % (ref 0.3–6.2)
ERYTHROCYTE [DISTWIDTH] IN BLOOD BY AUTOMATED COUNT: 13.8 % (ref 12.3–15.4)
GFR SERPL CREATININE-BSD FRML MDRD: 85 ML/MIN/1.73
GLUCOSE SERPL-MCNC: 87 MG/DL (ref 65–99)
GLUCOSE UR STRIP-MCNC: NEGATIVE MG/DL
HCT VFR BLD AUTO: 45.5 % (ref 34–46.6)
HGB BLD-MCNC: 15 G/DL (ref 12–15.9)
HGB UR QL STRIP.AUTO: ABNORMAL
HYALINE CASTS UR QL AUTO: ABNORMAL /LPF
IMM GRANULOCYTES # BLD AUTO: 0.05 10*3/MM3 (ref 0–0.05)
IMM GRANULOCYTES NFR BLD AUTO: 0.5 % (ref 0–0.5)
KETONES UR QL STRIP: NEGATIVE
LEUKOCYTE ESTERASE UR QL STRIP.AUTO: ABNORMAL
LYMPHOCYTES # BLD AUTO: 3.2 10*3/MM3 (ref 0.7–3.1)
LYMPHOCYTES NFR BLD AUTO: 30.1 % (ref 19.6–45.3)
MCH RBC QN AUTO: 27.6 PG (ref 26.6–33)
MCHC RBC AUTO-ENTMCNC: 33 G/DL (ref 31.5–35.7)
MCV RBC AUTO: 83.6 FL (ref 79–97)
MONOCYTES # BLD AUTO: 0.7 10*3/MM3 (ref 0.1–0.9)
MONOCYTES NFR BLD AUTO: 6.6 % (ref 5–12)
NEUTROPHILS NFR BLD AUTO: 59.2 % (ref 42.7–76)
NEUTROPHILS NFR BLD AUTO: 6.3 10*3/MM3 (ref 1.7–7)
NITRITE UR QL STRIP: NEGATIVE
NRBC BLD AUTO-RTO: 0 /100 WBC (ref 0–0.2)
PH UR STRIP.AUTO: 7 [PH] (ref 5–8)
PLATELET # BLD AUTO: 397 10*3/MM3 (ref 140–450)
PMV BLD AUTO: 9.9 FL (ref 6–12)
POTASSIUM SERPL-SCNC: 4.4 MMOL/L (ref 3.5–5.2)
PROT UR QL STRIP: NEGATIVE
RBC # BLD AUTO: 5.44 10*6/MM3 (ref 3.77–5.28)
RBC # UR: ABNORMAL /HPF
REF LAB TEST METHOD: ABNORMAL
SODIUM SERPL-SCNC: 138 MMOL/L (ref 136–145)
SP GR UR STRIP: 1.01 (ref 1–1.03)
SQUAMOUS #/AREA URNS HPF: ABNORMAL /HPF
UROBILINOGEN UR QL STRIP: ABNORMAL
WBC # BLD AUTO: 10.63 10*3/MM3 (ref 3.4–10.8)
WBC UR QL AUTO: ABNORMAL /HPF

## 2021-02-02 PROCEDURE — U0004 COV-19 TEST NON-CDC HGH THRU: HCPCS

## 2021-02-02 PROCEDURE — 87086 URINE CULTURE/COLONY COUNT: CPT

## 2021-02-02 PROCEDURE — 80048 BASIC METABOLIC PNL TOTAL CA: CPT

## 2021-02-02 PROCEDURE — 81001 URINALYSIS AUTO W/SCOPE: CPT

## 2021-02-02 PROCEDURE — 85025 COMPLETE CBC W/AUTO DIFF WBC: CPT

## 2021-02-02 PROCEDURE — C9803 HOPD COVID-19 SPEC COLLECT: HCPCS

## 2021-02-02 PROCEDURE — 81025 URINE PREGNANCY TEST: CPT

## 2021-02-02 PROCEDURE — 36415 COLL VENOUS BLD VENIPUNCTURE: CPT

## 2021-02-03 LAB
BACTERIA SPEC AEROBE CULT: NORMAL
SARS-COV-2 RNA RESP QL NAA+PROBE: NOT DETECTED

## 2021-02-04 NOTE — H&P
Faisal  Delores Sprague  : 1990  MRN: [id number]  CSN: [id number]    History and Physical    Subjective   Delores Sprague is a 30 y.o. year old  who present for surgery due to menorrhagia.    Past Medical History:   Diagnosis Date   • Anxiety    • Asthma 2021    Reported no use of inhaler since 2020   • Bladder leak     Patient reported after delivery of children she has recurrent trickle of urine and MD is trying to use medication to stop    • Body piercing     Ears   • Depression    • Disease of thyroid gland    • Diverticulosis    • Female infertility    • GERD (gastroesophageal reflux disease)    • H/O exercise stress test     , ,  - reported this was done secondary to lupus testing and that results from stress tests were wnl's    • History of bronchitis    • History of fracture     Toe, right foot (no surgery required)   • History of pneumonia    • Low kidney function     Patient reported during a prior pregnancy [id number] that resolved after delivery   • Lupus (CMS/HCC)    • Polycystic ovary syndrome    • PONV (postoperative nausea and vomiting)     With  in 2017 (reported with also low BP)   • Seasonal allergies    • Seasonal allergies    • Tattoos    • Wears glasses     Rx glasses     Past Surgical History:   Procedure Laterality Date   •  SECTION       - patient reported with removal of bilateral tubes   • ENDOSCOPY     • LAPAROSCOPIC CHOLECYSTECTOMY  2019   • SALPINGECTOMY Bilateral 2017    Patient reported was done with     • VAGINAL DELIVERY       Social History    Tobacco Use      Smoking status: Never Smoker      Smokeless tobacco: Never Used    No current facility-administered medications for this encounter.     Current Outpatient Medications:   •  albuterol sulfate  (90 Base) MCG/ACT inhaler, Inhale 2 puffs Every 4 (Four) Hours As Needed for Wheezing or Shortness of Air., Disp: 1 inhaler, Rfl: 11  •   busPIRone (BUSPAR) 5 MG tablet, Take 1-2 tablets by mouth every 8 hours as needed. (Patient taking differently: Take 5-10 mg by mouth 3 (Three) Times a Day As Needed (Anxiety). Take 1-2 tablets by mouth every 8 hours as needed.), Disp: 180 tablet, Rfl: 5  •  cetirizine (zyrTEC) 10 MG tablet, TAKE 1 TABLET BY MOUTH DAILY, Disp: 90 tablet, Rfl: 3  •  diclofenac (VOLTAREN) 75 MG EC tablet, Take 75 mg by mouth 2 (Two) Times a Day., Disp: , Rfl:   •  levothyroxine (Synthroid) 50 MCG tablet, Take 1 tablet by mouth Daily., Disp: 90 tablet, Rfl: 1  •  sertraline (ZOLOFT) 100 MG tablet, Take 1 tablet by mouth Daily., Disp: 90 tablet, Rfl: 1  •  traZODone (DESYREL) 50 MG tablet, TAKE 1/2 TO 1 TABLET BY MOUTH DAILY AT NIGHT 30 MINUTES BEFORE BEDTIME AS NEEDED FOR SLEEP (Patient taking differently: Take 25-50 mg by mouth At Night As Needed for Sleep. TAKE 1/2 TO 1 TABLET BY MOUTH DAILY AT NIGHT 30 MINUTES BEFORE BEDTIME AS NEEDED FOR SLEEP), Disp: 90 tablet, Rfl: 3  •  trospium (SANCTURA) 20 MG tablet, Take 20 mg by mouth 2 (Two) Times a Day., Disp: , Rfl:     Allergies   Allergen Reactions   • Ondansetron Headache     Pt states gives her migraines       Review of Systems   Constitutional: Negative.    HENT: Negative.    Respiratory: Negative.    Cardiovascular: Negative.          Objective   There were no vitals taken for this visit.  General: well developed; well nourished  no acute distress  obese - There is no height or weight on file to calculate BMI.   Heart: regular rate and rhythm, S1, S2 normal, no murmur, click, rub or gallop   Lungs: breathing is unlabored  clear to auscultation bilaterally   Abdomen: soft, non-tender; no masses  no umbilical or inguinal hernias are present  no hepato-splenomegaly   Pelvis:: Not performed.   Labs  Lab Results   Component Value Date     02/02/2021    HGB 15.0 02/02/2021    HCT 45.5 02/02/2021    WBC 10.63 02/02/2021     02/02/2021    K 4.4 02/02/2021     02/02/2021     CO2 25.9 02/02/2021    BUN 15 02/02/2021    CREATININE 0.79 02/02/2021    GLUCOSE 87 02/02/2021    ALBUMIN 4.50 01/12/2021    CALCIUM 9.0 02/02/2021    AST 21 01/12/2021    ALT 28 01/12/2021    BILITOT 0.2 01/12/2021        Assessment   1. Menorrhagia   2. Thickened Endometrium     Plan   1. Hysteroscopy, D&C   2. Risks, complications, benefits, and other alternatives discussed.    Xavier Yang MD  2/4/2021  15:32 EST

## 2021-02-05 ENCOUNTER — HOSPITAL ENCOUNTER (OUTPATIENT)
Facility: HOSPITAL | Age: 31
Setting detail: HOSPITAL OUTPATIENT SURGERY
Discharge: HOME | End: 2021-02-05
Attending: OBSTETRICS & GYNECOLOGY | Admitting: OBSTETRICS & GYNECOLOGY

## 2021-02-05 ENCOUNTER — ANESTHESIA (OUTPATIENT)
Dept: PERIOP | Facility: HOSPITAL | Age: 31
End: 2021-02-05

## 2021-02-05 ENCOUNTER — ANESTHESIA EVENT (OUTPATIENT)
Dept: PERIOP | Facility: HOSPITAL | Age: 31
End: 2021-02-05

## 2021-02-05 VITALS
OXYGEN SATURATION: 96 % | DIASTOLIC BLOOD PRESSURE: 70 MMHG | RESPIRATION RATE: 18 BRPM | SYSTOLIC BLOOD PRESSURE: 116 MMHG | TEMPERATURE: 97.3 F | HEART RATE: 80 BPM

## 2021-02-05 DIAGNOSIS — N93.9 ABNORMAL UTERINE BLEEDING: ICD-10-CM

## 2021-02-05 PROCEDURE — 25010000002 PROPOFOL 10 MG/ML EMULSION: Performed by: NURSE ANESTHETIST, CERTIFIED REGISTERED

## 2021-02-05 PROCEDURE — 58558 HYSTEROSCOPY BIOPSY: CPT | Performed by: OBSTETRICS & GYNECOLOGY

## 2021-02-05 PROCEDURE — 25010000003 LIDOCAINE 1 % SOLUTION: Performed by: OBSTETRICS & GYNECOLOGY

## 2021-02-05 PROCEDURE — 25010000002 KETOROLAC TROMETHAMINE PER 15 MG: Performed by: NURSE ANESTHETIST, CERTIFIED REGISTERED

## 2021-02-05 PROCEDURE — 94799 UNLISTED PULMONARY SVC/PX: CPT

## 2021-02-05 RX ORDER — IBUPROFEN 600 MG/1
600 TABLET ORAL EVERY 6 HOURS PRN
Qty: 30 TABLET | Refills: 1 | Status: SHIPPED | OUTPATIENT
Start: 2021-02-05

## 2021-02-05 RX ORDER — SODIUM CHLORIDE, SODIUM LACTATE, POTASSIUM CHLORIDE, CALCIUM CHLORIDE 600; 310; 30; 20 MG/100ML; MG/100ML; MG/100ML; MG/100ML
1000 INJECTION, SOLUTION INTRAVENOUS CONTINUOUS
Status: DISCONTINUED | OUTPATIENT
Start: 2021-02-05 | End: 2021-02-05 | Stop reason: HOSPADM

## 2021-02-05 RX ORDER — IBUPROFEN 600 MG/1
600 TABLET ORAL EVERY 6 HOURS PRN
Status: CANCELLED | OUTPATIENT
Start: 2021-02-05

## 2021-02-05 RX ORDER — SODIUM CHLORIDE 0.9 % (FLUSH) 0.9 %
10 SYRINGE (ML) INJECTION AS NEEDED
Status: DISCONTINUED | OUTPATIENT
Start: 2021-02-05 | End: 2021-02-05 | Stop reason: HOSPADM

## 2021-02-05 RX ORDER — ONDANSETRON 4 MG/1
4 TABLET, FILM COATED ORAL ONCE AS NEEDED
Status: DISCONTINUED | OUTPATIENT
Start: 2021-02-05 | End: 2021-02-05 | Stop reason: HOSPADM

## 2021-02-05 RX ORDER — LIDOCAINE HYDROCHLORIDE 10 MG/ML
INJECTION, SOLUTION INFILTRATION; PERINEURAL AS NEEDED
Status: DISCONTINUED | OUTPATIENT
Start: 2021-02-05 | End: 2021-02-05 | Stop reason: HOSPADM

## 2021-02-05 RX ORDER — HYDROCODONE BITARTRATE AND ACETAMINOPHEN 5; 325 MG/1; MG/1
1 TABLET ORAL EVERY 6 HOURS PRN
Qty: 4 TABLET | Refills: 0 | Status: SHIPPED | OUTPATIENT
Start: 2021-02-05

## 2021-02-05 RX ORDER — LIDOCAINE HYDROCHLORIDE 20 MG/ML
INJECTION, SOLUTION INTRAVENOUS AS NEEDED
Status: DISCONTINUED | OUTPATIENT
Start: 2021-02-05 | End: 2021-02-05 | Stop reason: SURG

## 2021-02-05 RX ORDER — MAGNESIUM HYDROXIDE 1200 MG/15ML
LIQUID ORAL AS NEEDED
Status: DISCONTINUED | OUTPATIENT
Start: 2021-02-05 | End: 2021-02-05 | Stop reason: HOSPADM

## 2021-02-05 RX ORDER — SODIUM CHLORIDE 0.9 % (FLUSH) 0.9 %
3 SYRINGE (ML) INJECTION EVERY 12 HOURS SCHEDULED
Status: DISCONTINUED | OUTPATIENT
Start: 2021-02-05 | End: 2021-02-05 | Stop reason: HOSPADM

## 2021-02-05 RX ORDER — HYDROCODONE BITARTRATE AND ACETAMINOPHEN 5; 325 MG/1; MG/1
1 TABLET ORAL ONCE AS NEEDED
Status: CANCELLED | OUTPATIENT
Start: 2021-02-05 | End: 2021-02-12

## 2021-02-05 RX ORDER — PROPOFOL 10 MG/ML
VIAL (ML) INTRAVENOUS AS NEEDED
Status: DISCONTINUED | OUTPATIENT
Start: 2021-02-05 | End: 2021-02-05 | Stop reason: SURG

## 2021-02-05 RX ORDER — KETOROLAC TROMETHAMINE 30 MG/ML
INJECTION, SOLUTION INTRAMUSCULAR; INTRAVENOUS AS NEEDED
Status: DISCONTINUED | OUTPATIENT
Start: 2021-02-05 | End: 2021-02-05 | Stop reason: SURG

## 2021-02-05 RX ADMIN — LIDOCAINE HYDROCHLORIDE 60 MG: 20 INJECTION, SOLUTION INTRAVENOUS at 11:11

## 2021-02-05 RX ADMIN — SODIUM CHLORIDE, POTASSIUM CHLORIDE, SODIUM LACTATE AND CALCIUM CHLORIDE 1000 ML: 600; 310; 30; 20 INJECTION, SOLUTION INTRAVENOUS at 10:00

## 2021-02-05 RX ADMIN — PROPOFOL 50 MG: 10 INJECTION, EMULSION INTRAVENOUS at 11:15

## 2021-02-05 RX ADMIN — KETOROLAC TROMETHAMINE 15 MG: 30 INJECTION, SOLUTION INTRAMUSCULAR at 11:11

## 2021-02-05 RX ADMIN — PROPOFOL 50 MG: 10 INJECTION, EMULSION INTRAVENOUS at 11:09

## 2021-02-05 RX ADMIN — PROPOFOL 50 MG: 10 INJECTION, EMULSION INTRAVENOUS at 11:20

## 2021-02-05 RX ADMIN — PROPOFOL 50 MG: 10 INJECTION, EMULSION INTRAVENOUS at 11:25

## 2021-02-05 NOTE — ANESTHESIA PREPROCEDURE EVALUATION
Anesthesia Evaluation     Patient summary reviewed and Nursing notes reviewed   history of anesthetic complications: PONV  NPO Solid Status: > 8 hours  NPO Liquid Status: > 8 hours           Airway   Mallampati: II  TM distance: >3 FB  Neck ROM: full  No difficulty expected  Dental      Pulmonary    (+) asthma,  Cardiovascular         Neuro/Psych  (+) psychiatric history Anxiety and Depression,     GI/Hepatic/Renal/Endo    (+) obesity, morbid obesity, GERD,  renal disease,     Musculoskeletal     Abdominal    Substance History      OB/GYN          Other                        Anesthesia Plan    ASA 3     MAC     intravenous induction     Anesthetic plan, all risks, benefits, and alternatives have been provided, discussed and informed consent has been obtained with: patient.    Plan discussed with CRNA.

## 2021-02-05 NOTE — OP NOTE
Faisal Sprague  : 1990  MRN: [id number]  CSN: [id number]  Date: 2021    Operative Report    DILATATION AND CURETTAGE HYSTEROSCOPY      Pre-op Diagnosis:  Abnormal uterine bleeding [N93.9]   Post-op Diagnosis:  Post-Op Diagnosis Codes:     * Abnormal uterine bleeding [N93.9]   Procedure: Procedure(s):  DILATATION AND CURETTAGE HYSTEROSCOPY   Surgeon: GISELLA Yang M.D.   Assist: staff  was responsible for performing the following activities: Retraction and their skilled assistance was necessary for the success of this case.     Anesthesia: Sedation   Estimated Blood Loss: <5 mls   ABx: none   Specimens:  Endometrial curettings   Findings: Thickened endometrium, no hypervascularity, no polyps, tubal ostia noted bilaterally   Complications: none   Indications:   Is a 30-year-old with menometrorrhagia through nonresponsive to medical management presenting for dilatation and curettage.  Risks benefits and alternatives were discussed and all questions were answered.     Description of Procedure:  After the appropriate time out and adequate dosing of her anesthesia, the patient had been prepped and draped in the usual sterile fashion.  She was placed in the dorsal lithotomy position using Danilo stirrups.  The bladder had been drained with a red rubber catheter per nursing.  A weighted speculum was placed in the vagina.  The anterior lip of the cervix was grasped with a single-tooth tenaculum.  The cervix was injected at the 3 o'clock and 9 o'clock position with 1% lidocaine plain without any complications.  The cervix was then progressively dilated using Spears dilators.  Rigid hysteroscopy was then performed with the above findings noted.  Sharp curettings were then obtained with a good cry throughout with tissue retrieved and sent for pathologic specimen.  The uterus was sounded to 8 cms.   The cervical tenaculum was removed and the cervix was noted to be hemostatic.  All instrument and  sponge counts were correct at the end of the procedure.  The patient tolerated the procedure well.  There were no complications.  She was taken to the postoperative recovery room in stable condition.    GISELLA Yang M.D.  2/5/2021  11:48 EST

## 2021-02-05 NOTE — DISCHARGE INSTRUCTIONS
Please follow all post op instructions and follow up appointment time from your physician's office included in your discharge packet.    Rest today     Pelvic rest is best described as not putting anything in your vagina. This includes tampons, douching, tub bathing or sexual activity.    No pushing, pulling, tugging,  heavy lifting, or strenuous activity.  No major decision making, driving, or drinking alcoholic beverages for 24 hours. ( due to the medications you have  received)  Always use good hand hygiene/washing techniques.  NO driving while taking pain medications.    To assist you in voiding:  Drink plenty of fluids  Listen to running water while attempting to void.    If you are unable to urinate and you have an uncomfortable urge to void or it has been   6 hours since you were discharged, return to the Emergency Room

## 2021-02-05 NOTE — ANESTHESIA POSTPROCEDURE EVALUATION
Patient: Delores Sprague    Procedure Summary     Date: 02/05/21 Room / Location: [facility] OR  /  [facility]    Anesthesia Start: 1107 Anesthesia Stop: 1128    Procedure: DILATATION AND CURETTAGE HYSTEROSCOPY (N/A Uterus) Diagnosis:       Abnormal uterine bleeding      (Abnormal uterine bleeding [N93.9])    Surgeon: Xavier Yang MD Provider: Poli Crowley CRNA    Anesthesia Type: MAC ASA Status: 3          Anesthesia Type: MAC    Vitals  Vitals Value Taken Time   /70 02/05/21 1215   Temp 97.3 °F (36.3 °C) 02/05/21 1145   Pulse 80 02/05/21 1215   Resp 18 02/05/21 1215   SpO2 96 % 02/05/21 1215           Post Anesthesia Care and Evaluation    Patient location during evaluation: bedside  Patient participation: complete - patient participated  Level of consciousness: awake  Pain score: 0  Pain management: adequate  Airway patency: patent  Anesthetic complications: No anesthetic complications  PONV Status: controlled  Cardiovascular status: acceptable and stable  Respiratory status: acceptable and room air  Hydration status: acceptable

## 2021-02-07 ENCOUNTER — TELEPHONE (OUTPATIENT)
Dept: FAMILY MEDICINE CLINIC | Facility: CLINIC | Age: 31
End: 2021-02-07

## 2021-02-07 ENCOUNTER — APPOINTMENT (OUTPATIENT)
Dept: GENERAL RADIOLOGY | Facility: HOSPITAL | Age: 31
End: 2021-02-07

## 2021-02-07 ENCOUNTER — HOSPITAL ENCOUNTER (EMERGENCY)
Facility: HOSPITAL | Age: 31
Discharge: HOME | End: 2021-02-07
Attending: STUDENT IN AN ORGANIZED HEALTH CARE EDUCATION/TRAINING PROGRAM | Admitting: EMERGENCY MEDICINE

## 2021-02-07 ENCOUNTER — APPOINTMENT (OUTPATIENT)
Dept: CT IMAGING | Facility: HOSPITAL | Age: 31
End: 2021-02-07

## 2021-02-07 VITALS
DIASTOLIC BLOOD PRESSURE: 72 MMHG | RESPIRATION RATE: 18 BRPM | HEART RATE: 76 BPM | HEIGHT: 68 IN | BODY MASS INDEX: 44.41 KG/M2 | TEMPERATURE: 98.4 F | SYSTOLIC BLOOD PRESSURE: 107 MMHG | OXYGEN SATURATION: 98 % | WEIGHT: 293 LBS

## 2021-02-07 DIAGNOSIS — R55 SYNCOPE AND COLLAPSE: Primary | ICD-10-CM

## 2021-02-07 LAB
ALBUMIN SERPL-MCNC: 3.8 G/DL (ref 3.5–5.2)
ALBUMIN/GLOB SERPL: 1.1 G/DL
ALP SERPL-CCNC: 96 U/L (ref 39–117)
ALT SERPL W P-5'-P-CCNC: 22 U/L (ref 1–33)
ANION GAP SERPL CALCULATED.3IONS-SCNC: 10.2 MMOL/L (ref 5–15)
AST SERPL-CCNC: 16 U/L (ref 1–32)
B-HCG UR QL: NEGATIVE
BACTERIA UR QL AUTO: ABNORMAL /HPF
BASOPHILS # BLD AUTO: 0.07 10*3/MM3 (ref 0–0.2)
BASOPHILS NFR BLD AUTO: 0.5 % (ref 0–1.5)
BILIRUB SERPL-MCNC: <0.2 MG/DL (ref 0–1.2)
BILIRUB UR QL STRIP: NEGATIVE
BUN SERPL-MCNC: 11 MG/DL (ref 6–20)
BUN/CREAT SERPL: 12.6 (ref 7–25)
CALCIUM SPEC-SCNC: 9.2 MG/DL (ref 8.6–10.5)
CHLORIDE SERPL-SCNC: 102 MMOL/L (ref 98–107)
CK SERPL-CCNC: 54 U/L (ref 20–180)
CLARITY UR: CLEAR
CO2 SERPL-SCNC: 26.8 MMOL/L (ref 22–29)
COLOR UR: YELLOW
CREAT SERPL-MCNC: 0.87 MG/DL (ref 0.57–1)
D DIMER PPP FEU-MCNC: 0.44 MCGFEU/ML (ref 0–0.57)
DEPRECATED RDW RBC AUTO: 42 FL (ref 37–54)
EOSINOPHIL # BLD AUTO: 0.47 10*3/MM3 (ref 0–0.4)
EOSINOPHIL NFR BLD AUTO: 3.4 % (ref 0.3–6.2)
ERYTHROCYTE [DISTWIDTH] IN BLOOD BY AUTOMATED COUNT: 13.6 % (ref 12.3–15.4)
GFR SERPL CREATININE-BSD FRML MDRD: 76 ML/MIN/1.73
GLOBULIN UR ELPH-MCNC: 3.4 GM/DL
GLUCOSE SERPL-MCNC: 100 MG/DL (ref 65–99)
GLUCOSE UR STRIP-MCNC: NEGATIVE MG/DL
HCT VFR BLD AUTO: 47.6 % (ref 34–46.6)
HGB BLD-MCNC: 15.4 G/DL (ref 12–15.9)
HGB UR QL STRIP.AUTO: ABNORMAL
HOLD SPECIMEN: NORMAL
HOLD SPECIMEN: NORMAL
HYALINE CASTS UR QL AUTO: ABNORMAL /LPF
IMM GRANULOCYTES # BLD AUTO: 0.06 10*3/MM3 (ref 0–0.05)
IMM GRANULOCYTES NFR BLD AUTO: 0.4 % (ref 0–0.5)
KETONES UR QL STRIP: NEGATIVE
LEUKOCYTE ESTERASE UR QL STRIP.AUTO: ABNORMAL
LYMPHOCYTES # BLD AUTO: 3.61 10*3/MM3 (ref 0.7–3.1)
LYMPHOCYTES NFR BLD AUTO: 26 % (ref 19.6–45.3)
MAGNESIUM SERPL-MCNC: 2.1 MG/DL (ref 1.6–2.6)
MCH RBC QN AUTO: 27.4 PG (ref 26.6–33)
MCHC RBC AUTO-ENTMCNC: 32.4 G/DL (ref 31.5–35.7)
MCV RBC AUTO: 84.7 FL (ref 79–97)
MONOCYTES # BLD AUTO: 0.71 10*3/MM3 (ref 0.1–0.9)
MONOCYTES NFR BLD AUTO: 5.1 % (ref 5–12)
NEUTROPHILS NFR BLD AUTO: 64.6 % (ref 42.7–76)
NEUTROPHILS NFR BLD AUTO: 8.97 10*3/MM3 (ref 1.7–7)
NITRITE UR QL STRIP: NEGATIVE
NRBC BLD AUTO-RTO: 0 /100 WBC (ref 0–0.2)
PH UR STRIP.AUTO: 6.5 [PH] (ref 5–8)
PLATELET # BLD AUTO: 335 10*3/MM3 (ref 140–450)
PMV BLD AUTO: 9.5 FL (ref 6–12)
POTASSIUM SERPL-SCNC: 4.6 MMOL/L (ref 3.5–5.2)
PROT SERPL-MCNC: 7.2 G/DL (ref 6–8.5)
PROT UR QL STRIP: NEGATIVE
RBC # BLD AUTO: 5.62 10*6/MM3 (ref 3.77–5.28)
RBC # UR: ABNORMAL /HPF
REF LAB TEST METHOD: ABNORMAL
SODIUM SERPL-SCNC: 139 MMOL/L (ref 136–145)
SP GR UR STRIP: 1.01 (ref 1–1.03)
SQUAMOUS #/AREA URNS HPF: ABNORMAL /HPF
UROBILINOGEN UR QL STRIP: ABNORMAL
WBC # BLD AUTO: 13.89 10*3/MM3 (ref 3.4–10.8)
WBC UR QL AUTO: ABNORMAL /HPF
WHOLE BLOOD HOLD SPECIMEN: NORMAL
WHOLE BLOOD HOLD SPECIMEN: NORMAL

## 2021-02-07 PROCEDURE — 83735 ASSAY OF MAGNESIUM: CPT | Performed by: PHYSICIAN ASSISTANT

## 2021-02-07 PROCEDURE — 81025 URINE PREGNANCY TEST: CPT | Performed by: PHYSICIAN ASSISTANT

## 2021-02-07 PROCEDURE — 71045 X-RAY EXAM CHEST 1 VIEW: CPT

## 2021-02-07 PROCEDURE — 70450 CT HEAD/BRAIN W/O DYE: CPT

## 2021-02-07 PROCEDURE — 82550 ASSAY OF CK (CPK): CPT | Performed by: PHYSICIAN ASSISTANT

## 2021-02-07 PROCEDURE — 93005 ELECTROCARDIOGRAM TRACING: CPT | Performed by: STUDENT IN AN ORGANIZED HEALTH CARE EDUCATION/TRAINING PROGRAM

## 2021-02-07 PROCEDURE — 99284 EMERGENCY DEPT VISIT MOD MDM: CPT

## 2021-02-07 PROCEDURE — 85379 FIBRIN DEGRADATION QUANT: CPT | Performed by: STUDENT IN AN ORGANIZED HEALTH CARE EDUCATION/TRAINING PROGRAM

## 2021-02-07 PROCEDURE — 80053 COMPREHEN METABOLIC PANEL: CPT | Performed by: PHYSICIAN ASSISTANT

## 2021-02-07 PROCEDURE — 85025 COMPLETE CBC W/AUTO DIFF WBC: CPT | Performed by: PHYSICIAN ASSISTANT

## 2021-02-07 PROCEDURE — 81001 URINALYSIS AUTO W/SCOPE: CPT | Performed by: PHYSICIAN ASSISTANT

## 2021-02-07 RX ORDER — ONDANSETRON 2 MG/ML
4 INJECTION INTRAMUSCULAR; INTRAVENOUS ONCE
Status: DISCONTINUED | OUTPATIENT
Start: 2021-02-07 | End: 2021-02-07 | Stop reason: HOSPADM

## 2021-02-07 RX ORDER — SODIUM CHLORIDE 0.9 % (FLUSH) 0.9 %
10 SYRINGE (ML) INJECTION AS NEEDED
Status: DISCONTINUED | OUTPATIENT
Start: 2021-02-07 | End: 2021-02-07 | Stop reason: HOSPADM

## 2021-02-07 RX ORDER — MECLIZINE HYDROCHLORIDE 25 MG/1
25 TABLET ORAL 3 TIMES DAILY PRN
Qty: 20 TABLET | Refills: 0 | Status: SHIPPED | OUTPATIENT
Start: 2021-02-07

## 2021-02-07 RX ADMIN — SODIUM CHLORIDE 1000 ML: 9 INJECTION, SOLUTION INTRAVENOUS at 18:22

## 2021-02-07 NOTE — TELEPHONE ENCOUNTER
" contacted on call service at 4:50 PM. Mrs. Sprague had D&C Friday.  reports she apparently had some respiratory distress requiring \"chest compressions and intubation\". She fainted last night after walking up the stairs. Today has been intermittently dizzy with odd CP, mild SOA, etc. Now complaining of right arm numbness.    No fever or UTI symptoms. No heavy vaginal bleeding. Pt not previously anemic per recent CBC.    I advised  to take her to ER for further eval. If she acutely worsens while they are preparing to leave, he should call 911. He voiced understanding of instructions and denied further questions.      "

## 2021-02-08 ENCOUNTER — OFFICE VISIT (OUTPATIENT)
Dept: INTERNAL MEDICINE | Facility: CLINIC | Age: 31
End: 2021-02-08

## 2021-02-08 VITALS
WEIGHT: 293 LBS | OXYGEN SATURATION: 99 % | HEART RATE: 81 BPM | BODY MASS INDEX: 44.41 KG/M2 | HEIGHT: 68 IN | TEMPERATURE: 98 F | DIASTOLIC BLOOD PRESSURE: 84 MMHG | SYSTOLIC BLOOD PRESSURE: 128 MMHG

## 2021-02-08 DIAGNOSIS — N93.9 ABNORMAL UTERINE BLEEDING: ICD-10-CM

## 2021-02-08 DIAGNOSIS — R55 SYNCOPE, UNSPECIFIED SYNCOPE TYPE: Primary | ICD-10-CM

## 2021-02-08 PROCEDURE — 99214 OFFICE O/P EST MOD 30 MIN: CPT | Performed by: PHYSICIAN ASSISTANT

## 2021-02-08 NOTE — ED PROVIDER NOTES
Subjective   30-year-old female presents the ER with complaints of dizziness.  Patient states this symptoms have been present since having a D&C on Friday by Dr. Yang.  Patient states has led to syncopal episodes.  Patient states she has a sore throat as well complaining of substernal chest pain.  Patient is unsure as well or not she was intubated during her procedure.          Review of Systems   Constitutional: Negative.  Negative for fever.   HENT: Negative.    Respiratory: Negative.    Cardiovascular: Negative.  Negative for chest pain.   Gastrointestinal: Positive for nausea. Negative for abdominal pain.   Endocrine: Negative.    Genitourinary: Negative.  Negative for dysuria.   Skin: Negative.    Neurological: Positive for dizziness and syncope.   Psychiatric/Behavioral: Negative.    All other systems reviewed and are negative.      Past Medical History:   Diagnosis Date   • Anxiety    • Asthma 2021    Reported no use of inhaler since 2020   • Bladder leak     Patient reported after delivery of children she has recurrent trickle of urine and MD is trying to use medication to stop    • Body piercing     Ears   • Depression    • Disease of thyroid gland    • Diverticulosis    • Female infertility    • GERD (gastroesophageal reflux disease)    • H/O exercise stress test     , , 2006 - reported this was done secondary to lupus testing and that results from stress tests were wnl's    • History of bronchitis    • History of fracture     Toe, right foot (no surgery required)   • History of pneumonia    • Low kidney function     Patient reported during a prior pregnancy [id number] that resolved after delivery   • Lupus (CMS/HCC)    • Polycystic ovary syndrome    • PONV (postoperative nausea and vomiting)     With  in 2017 (reported with also low BP)   • Seasonal allergies    • Seasonal allergies    • Tattoos    • Wears glasses     Rx glasses       Allergies   Allergen Reactions   •  Ondansetron Headache     Pt states gives her migraines       Past Surgical History:   Procedure Laterality Date   •  SECTION      2017 - patient reported with removal of bilateral tubes   • DILATATION AND CURETTAGE     • ENDOSCOPY     • LAPAROSCOPIC CHOLECYSTECTOMY  2019   • SALPINGECTOMY Bilateral 2017    Patient reported was done with     • VAGINAL DELIVERY  2016       Family History   Problem Relation Age of Onset   • Diabetes Mother    • Hypertension Mother    • Hyperlipidemia Mother    • Breast cancer Mother         dx late 40s or early 50s   • Uterine cancer Mother    • Diabetes Father    • Mental illness Father    • Pancreatic cancer Paternal Grandfather    • Liver cancer Other    • Breast cancer Other    • Uterine cancer Other    • Lung cancer Other    • Lung cancer Maternal Grandmother    • COPD Maternal Grandfather        Social History     Socioeconomic History   • Marital status:      Spouse name: Not on file   • Number of children: Not on file   • Years of education: Not on file   • Highest education level: Not on file   Tobacco Use   • Smoking status: Never Smoker   • Smokeless tobacco: Never Used   Substance and Sexual Activity   • Alcohol use: No   • Drug use: No   • Sexual activity: Yes     Partners: Male     Birth control/protection: Surgical           Objective   Physical Exam  Vitals signs and nursing note reviewed.   Constitutional:       General: She is not in acute distress.     Appearance: She is well-developed. She is not diaphoretic.   HENT:      Head: Normocephalic and atraumatic.      Right Ear: External ear normal.      Left Ear: External ear normal.      Nose: Nose normal.   Eyes:      Conjunctiva/sclera: Conjunctivae normal.   Neck:      Musculoskeletal: Normal range of motion and neck supple.      Vascular: No JVD.      Trachea: No tracheal deviation.   Cardiovascular:      Rate and Rhythm: Normal rate and regular rhythm.      Heart sounds: No murmur.    Pulmonary:      Effort: Pulmonary effort is normal. No respiratory distress.      Breath sounds: No wheezing.   Abdominal:      Palpations: Abdomen is soft.      Tenderness: There is no abdominal tenderness.   Musculoskeletal: Normal range of motion.         General: No deformity.   Skin:     General: Skin is warm and dry.      Coloration: Skin is not pale.      Findings: No erythema or rash.   Neurological:      Mental Status: She is alert and oriented to person, place, and time.      Cranial Nerves: No cranial nerve deficit.   Psychiatric:         Behavior: Behavior normal.         Thought Content: Thought content normal.         Procedures           ED Course  ED Course as of Feb 07 2131   Sun Feb 07, 2021 1853 EKG shows sinus rhythm rate of 71.  Nonspecific T waves.  Abnormal EKG.  Interpreted by me.    [DT]   2130 CT head read interpreted: No acute intracranial hemorrhage or large-acute cortical infarct    [RB]      ED Course User Index  [DT] Urbano Longo MD  [RB] Yong Klein II, PA                                           MDM  Number of Diagnoses or Management Options  Syncope and collapse: new and requires workup     Amount and/or Complexity of Data Reviewed  Clinical lab tests: ordered and reviewed  Tests in the radiology section of CPT®: ordered and reviewed  Decide to obtain previous medical records or to obtain history from someone other than the patient: yes  Review and summarize past medical records: yes  Independent visualization of images, tracings, or specimens: yes    Risk of Complications, Morbidity, and/or Mortality  Presenting problems: moderate  Diagnostic procedures: moderate  Management options: moderate    Patient Progress  Patient progress: stable      Final diagnoses:   Syncope and collapse            Yong Klein II, PA  02/07/21 2131

## 2021-02-08 NOTE — PROGRESS NOTES
Chief Complaint  Follow-up (ED for syncope)    Subjective          Delores Sprague presents to [facility] for ED follow-up.  History of Present Illness  Here today for follow-up from ED visit 2/7/2020 where she presented with reports that 1 day prior she had a syncopal episode preceded by dizziness after climbing a flight of stairs. She underwent dilation and curettage on 2/5/2021 for abnormal uterine bleeding and since awakening from anesthesia she has had substernal chest pain, sore throat, pain in her jaw, mild SOA with exertion, dizziness and occasional right arm numbness.. Her  called the on-call service yesterday (2/7) with report of syncope the night before (2/6) as well as the aforementioned symptoms which have persisted that day. He was advised by the on call provider to take her to the ED where she was evaluated with chest x-ray and CT head which were unremarkable as well as labs which were unremarkable with the exception of urinalysis positive for leukocytes and blood.  She reports that jaw pain is not as bothersome today but she still has the same chest pain and SOA with exertion. She did have some occasional dizziness when walking up stairs prior to her procedure but notes that it has been worse since then.  She is found that she sometimes holds her breath while walking up a flight of stairs but has been mindful about ensuring she is breathing while taking the stairs since the syncopal episode.  She denies any further syncopal episodes. She is currently experiencing a little vaginal spotting which has decreased from some heavier spotting on day 1 postop. She was having bilateral pelvic pains yesterday and some today but not very bothersome. She is not taking pain medication currently nor was she the night of the syncopal event.      She was told by a nurse in postop that she was intubated for the procedure which she was not expecting.        Objective   Vital  "Signs:   /84   Pulse 81   Temp 98 °F (36.7 °C)   Ht 172.7 cm (67.99\")   Wt (!) 141 kg (310 lb)   SpO2 99%   BMI 47.15 kg/m²     Physical Exam  Vitals signs and nursing note reviewed.   Constitutional:       General: She is not in acute distress.     Appearance: She is well-developed. She is morbidly obese. She is not ill-appearing, toxic-appearing or diaphoretic.      Interventions: Face mask in place.   HENT:      Head: Normocephalic and atraumatic.      Jaw: There is normal jaw occlusion. Tenderness present. No malocclusion.      Salivary Glands: Right salivary gland is not diffusely enlarged.        Right Ear: Hearing, tympanic membrane, ear canal and external ear normal.      Left Ear: Hearing, tympanic membrane, ear canal and external ear normal.      Mouth/Throat:      Lips: Pink. No lesions.      Mouth: No oral lesions or angioedema.      Dentition: Normal dentition. No dental tenderness, dental abscesses or gum lesions.      Tongue: No lesions. Tongue does not deviate from midline.      Palate: No mass and lesions.      Pharynx: Oropharynx is clear. Uvula midline. No pharyngeal swelling, oropharyngeal exudate, posterior oropharyngeal erythema or uvula swelling.   Eyes:      General: No scleral icterus.     Extraocular Movements: Extraocular movements intact.      Conjunctiva/sclera: Conjunctivae normal.      Pupils: Pupils are equal, round, and reactive to light.   Neck:      Musculoskeletal: Normal range of motion and neck supple. Normal range of motion. Muscular tenderness present. No edema, erythema, neck rigidity, crepitus or injury.      Thyroid: No thyroid mass.      Trachea: Trachea and phonation normal.     Cardiovascular:      Rate and Rhythm: Normal rate and regular rhythm.      Heart sounds: Normal heart sounds. No murmur. No friction rub. No gallop.    Pulmonary:      Effort: Pulmonary effort is normal. No respiratory distress.      Breath sounds: Normal breath sounds. No wheezing, " rhonchi or rales.   Chest:      Chest wall: Tenderness present. No mass, deformity, swelling, crepitus or edema. There is no dullness to percussion.       Abdominal:      General: Bowel sounds are normal.      Palpations: Abdomen is soft.      Tenderness: There is no abdominal tenderness. There is no right CVA tenderness, left CVA tenderness or guarding.   Musculoskeletal: Normal range of motion.         General: No tenderness or deformity.      Right lower leg: No edema.      Left lower leg: No edema.   Lymphadenopathy:      Cervical: No cervical adenopathy.      Right cervical: No superficial, deep or posterior cervical adenopathy.     Left cervical: No superficial, deep or posterior cervical adenopathy.      Upper Body:      Right upper body: No supraclavicular adenopathy.      Left upper body: No supraclavicular adenopathy.   Skin:     General: Skin is warm and dry.      Capillary Refill: Capillary refill takes less than 2 seconds.      Coloration: Skin is not pale.      Findings: No erythema, lesion or rash.   Neurological:      General: No focal deficit present.      Mental Status: She is alert and oriented to person, place, and time.      Cranial Nerves: No cranial nerve deficit.      Sensory: No sensory deficit.      Motor: No abnormal muscle tone.      Coordination: Coordination normal.      Gait: Gait normal.      Deep Tendon Reflexes: Reflexes normal.   Psychiatric:         Mood and Affect: Mood normal.         Behavior: Behavior normal. Behavior is cooperative.         Thought Content: Thought content normal.         Judgment: Judgment normal.        Result Review :   The following data was reviewed by: NATHALIA Ag on 02/08/2021:  CMP    CMP 1/12/21 2/2/21 2/7/21   Glucose 98 87 100 (A)   BUN 13 15 11   Creatinine 0.84 0.79 0.87   eGFR Non African Am 79 85 76   Sodium 139 138 139   Potassium 4.0 4.4 4.6   Chloride 99 102 102   Calcium 9.5 9.0 9.2   Albumin 4.50  3.80   Total Bilirubin 0.2   <0.2   Alkaline Phosphatase 117  96   AST (SGOT) 21  16   ALT (SGPT) 28  22   (A) Abnormal value       Comments are available for some flowsheets but are not being displayed.           CBC    CBC 1/19/21 2/2/21 2/7/21   WBC 12.37 (A) 10.63 13.89 (A)   RBC 5.64 (A) 5.44 (A) 5.62 (A)   Hemoglobin 15.8 15.0 15.4   Hematocrit 46.5 45.5 47.6 (A)   MCV 82.4 83.6 84.7   MCH 28.0 27.6 27.4   MCHC 34.0 33.0 32.4   RDW 14.0 13.8 13.6   Platelets 416 397 335   (A) Abnormal value            UA    Urinalysis 1/12/21 1/12/21 2/2/21 2/2/21 2/7/21 2/7/21    1546 1546 0950 0950 1819 1819   Squamous Epithelial Cells, UA  7-12 (A)  7-12 (A)  3-6 (A)   Specific [place], UA 1.016  1.010  1.007    Ketones, UA Negative  Negative  Negative    Blood, UA Large (3+) (A)  Small (1+) (A)  Large (3+) (A)    Leukocytes, UA Negative  Moderate (2+) (A)  Trace (A)    Nitrite, UA Negative  Negative  Negative    RBC, UA  0-2 (A)  0-2 (A)  6-12 (A)   WBC, UA  0-2 (A)  6-12 (A)  0-2 (A)   Bacteria, UA  1+ (A)  Trace (A)  Trace (A)   (A) Abnormal value            Data reviewed: operative notes, ED note          Assessment and Plan    Problem List Items Addressed This Visit        Genitourinary and Reproductive     Abnormal uterine bleeding    Overview     Added automatically from request for surgery [id number]    Minimal bleeding postop.  Follow-up with gynecology as scheduled.         Other Visit Diagnoses     Syncope, unspecified syncope type    -  Primary  Suspect vasovagal syncope.  She was encouraged to use extreme caution when walking upstairs and to take things slow and ensure she is not holding her breath.  Chest pain and sore throat are likely due to intubation and should resolve over the next few days.  Return to ER with any subsequent concerning events.        I spent 31 minutes caring for Delores on this date of service. This time includes time spent by me in the following activities:preparing for the visit, reviewing tests, obtaining and/or  reviewing a separately obtained history, performing a medically appropriate examination and/or evaluation , counseling and educating the patient/family/caregiver, documenting information in the medical record and independently interpreting results and communicating that information with the patient/family/caregiver  Follow Up   Return if symptoms worsen or fail to improve.  Patient was given instructions and counseling regarding her condition or for health maintenance advice. Please see specific information pulled into the AVS if appropriate.

## 2021-02-09 LAB
LAB AP CASE REPORT: NORMAL
PATH REPORT.FINAL DX SPEC: NORMAL

## 2021-02-11 DIAGNOSIS — F41.1 GENERALIZED ANXIETY DISORDER: ICD-10-CM

## 2021-02-12 RX ORDER — SERTRALINE HYDROCHLORIDE 100 MG/1
100 TABLET, FILM COATED ORAL DAILY
Qty: 90 TABLET | Refills: 3 | Status: SHIPPED | OUTPATIENT
Start: 2021-02-12

## 2021-02-17 ENCOUNTER — TELEPHONE (OUTPATIENT)
Dept: OBSTETRICS AND GYNECOLOGY | Facility: CLINIC | Age: 31
End: 2021-02-17

## 2021-04-05 NOTE — TELEPHONE ENCOUNTER
Pt informed, some labs still pending, we will call when all are back   There are no Wet Read(s) to document.

## 2021-04-21 DIAGNOSIS — E06.3 HYPOTHYROIDISM DUE TO HASHIMOTO'S THYROIDITIS: ICD-10-CM

## 2021-04-21 DIAGNOSIS — E03.8 HYPOTHYROIDISM DUE TO HASHIMOTO'S THYROIDITIS: ICD-10-CM

## 2021-04-22 RX ORDER — LEVOTHYROXINE SODIUM 0.05 MG/1
50 TABLET ORAL DAILY
Qty: 90 TABLET | Refills: 0 | Status: SHIPPED | OUTPATIENT
Start: 2021-04-22

## 2021-04-22 NOTE — TELEPHONE ENCOUNTER
Recently moved to [place] per patient report. Needs to establish with local PCP for further refills.

## 2024-03-04 NOTE — ED PROVIDER NOTES
Facility called back stating her noon recheck was 500 and they sent her to [facility]      Subjective   29-year-old female that presents to the emergency department with complaints of 3 days of nausea and vomiting as well as epigastric to right upper quadrant pain.  Patient does not have a gallbladder.  She states the pain that she is having in her abdomen feels like it did when she was pregnant and her baby would kick her in the ribs.  Patient is not currently pregnant.  Pain comes in waves, is moderate to severe, described as an ache, nothing makes better or worse.  The patient states when she vomits she has severe heartburn.  She does state that she was seen at the urgent care 2 days ago where they gave her Zofran.  She states this is not helping only given her headache.          Review of Systems   All other systems reviewed and are negative.      Past Medical History:   Diagnosis Date   • Arthritis    • Asthma    • Depression    • Diverticulosis    • GERD (gastroesophageal reflux disease)    • Lupus (CMS/HCC)    • Seasonal allergies        No Known Allergies    Past Surgical History:   Procedure Laterality Date   •  SECTION     • GALLBLADDER SURGERY     • LAPAROSCOPIC CHOLECYSTECTOMY  2019   • SALPINGECTOMY Bilateral        Family History   Problem Relation Age of Onset   • Diabetes Mother    • Hypertension Mother    • Hyperlipidemia Mother    • Diabetes Father    • Mental illness Father    • Pancreatic cancer Paternal Grandfather    • Liver cancer Other    • Breast cancer Other    • Uterine cancer Other    • Lung cancer Other    • Lung cancer Maternal Grandmother    • COPD Maternal Grandfather        Social History     Socioeconomic History   • Marital status:      Spouse name: Not on file   • Number of children: Not on file   • Years of education: Not on file   • Highest education level: Not on file   Tobacco Use   • Smoking status: Never Smoker   • Smokeless tobacco: Never Used   Substance and Sexual Activity   • Alcohol use: No   • Drug use: No   • Sexual activity: Defer            Objective   Physical Exam   Nursing note and vitals reviewed.      GEN: No acute distress  Head: Normocephalic, atraumatic  Eyes: Pupils equal round reactive to light  ENT: Posterior pharynx normal in appearance, oral mucosa is moist  Chest: Nontender to palpation  Cardiovascular: Regular rate  Lungs: Clear to auscultation bilaterally  Abdomen: Soft, nontender, nondistended, no peritoneal signs  Extremities: No edema, normal appearance  Neuro: GCS 15  Psych: Mood and affect are appropriate      Procedures           ED Course                                               MDM  Number of Diagnoses or Management Options  Diagnosis management comments: Differential diagnosis would include viral versus bacterial gastroenteritis, food poisoning, irritable bowel syndrome, or other concerns  Patient was treated with IV fluids and promethazine with good symptom control  At this time I am unsure what is causing the patient's symptoms but highly doubt any life-threatening or sinister pathology  Patient will be sent home on oral antiemetics  Recommended follow-up in 3-4 days if not improving       Amount and/or Complexity of Data Reviewed  Clinical lab tests: ordered  Decide to obtain previous medical records or to obtain history from someone other than the patient: yes  Obtain history from someone other than the patient: yes  Review and summarize past medical records: yes        Final diagnoses:   Nausea and vomiting, intractability of vomiting not specified, unspecified vomiting type            Urbano Longo MD  01/02/20 8714

## (undated) DEVICE — SLV SCD CALF HEMOFORCE DVT THERP REPROC MD

## (undated) DEVICE — RICH MINOR LITHOTOMY: Brand: MEDLINE INDUSTRIES, INC.

## (undated) DEVICE — PAD SANI MAXI W/ADHS SNG WRP 11IN

## (undated) DEVICE — GLV SURG BIOGEL M LTX PF 8

## (undated) DEVICE — PREMIUM WET SKIN PREP TRAY CHG: Brand: MEDLINE INDUSTRIES, INC.

## (undated) DEVICE — SOL NACL 0.9PCT 1000ML

## (undated) DEVICE — SUT GUT CHRM 2/0 SH 27IN G123H

## (undated) DEVICE — SOL IRR H2O BTL 1000ML STRL

## (undated) DEVICE — ST IRR CYSTO W/SPK 77IN LF